# Patient Record
Sex: FEMALE | Race: BLACK OR AFRICAN AMERICAN | NOT HISPANIC OR LATINO | Employment: FULL TIME | URBAN - METROPOLITAN AREA
[De-identification: names, ages, dates, MRNs, and addresses within clinical notes are randomized per-mention and may not be internally consistent; named-entity substitution may affect disease eponyms.]

---

## 2023-09-15 ENCOUNTER — OFFICE VISIT (OUTPATIENT)
Dept: PRIMARY CARE | Facility: CLINIC | Age: 68
End: 2023-09-15
Payer: MEDICARE

## 2023-09-15 VITALS — BODY MASS INDEX: 23.86 KG/M2 | WEIGHT: 139 LBS | SYSTOLIC BLOOD PRESSURE: 131 MMHG | DIASTOLIC BLOOD PRESSURE: 71 MMHG

## 2023-09-15 DIAGNOSIS — M43.06 LUMBAR SPONDYLOLYSIS: Primary | ICD-10-CM

## 2023-09-15 DIAGNOSIS — R00.2 PALPITATIONS: ICD-10-CM

## 2023-09-15 PROCEDURE — 1157F ADVNC CARE PLAN IN RCRD: CPT | Performed by: INTERNAL MEDICINE

## 2023-09-15 PROCEDURE — 1125F AMNT PAIN NOTED PAIN PRSNT: CPT | Performed by: INTERNAL MEDICINE

## 2023-09-15 PROCEDURE — 99203 OFFICE O/P NEW LOW 30 MIN: CPT | Performed by: INTERNAL MEDICINE

## 2023-09-15 NOTE — PROGRESS NOTES
Subjective   Patient ID: Agnes Vasquez is a 68 y.o. female who presents for No chief complaint on file..    HPI the patient for first time see updated front sheet former [facility] and [facility] patient new chest pain no shortness of breath occasional palpitations has seen the cardiologist in the past not taking any medications in the current basis has side longstanding lower back pain and curvature to her spine but has not had physical therapy or intervention since prior to the COVID pandemic bowels normal no dysuria    Past medical history lumbar spondylosis hyperlipidemia    Medications none at the current time    Allergies no known drug allergies    Social history no tobacco    Family history there is a history of osteoarthritis    Prevention some exercise no recent blood work since the beginning of the year    Review of Systems    Objective   There were no vitals taken for this visit.    Physical Exam vital signs noted alert and oriented x3 NCAT no JVD or bruit chest clear to auscultation CV regular rate and rhythm S1-S2 without murmur gallop rub or skip thoracolumbar spine with scoliosis elevated shoulder and hip negative straight leg raise negative logroll negative SI joint tenderness normal gait extremities no clubbing cyanosis or edema normal distal pulses DTR 1+    Assessment/Plan impression lumbar spondylolysis other diagnoses palpitations?  Other diagnoses based on prior laboratory results  Plan we will recheck for regular physical examination and updated blood work okay for LS-spine film AP lateral requisition made and advised on possibility probability of medications pain management aqua therapy physical therapy or other interventions has occasionally taken Advil and heating pad for this has follow-up with cardiologist but none recently good water consumption back hygiene recheck as above

## 2023-09-26 ENCOUNTER — TELEPHONE (OUTPATIENT)
Dept: PRIMARY CARE | Facility: CLINIC | Age: 68
End: 2023-09-26
Payer: MEDICARE

## 2023-09-28 DIAGNOSIS — M43.06 LUMBAR SPONDYLOLYSIS: Primary | ICD-10-CM

## 2023-10-01 RX ORDER — MELOXICAM 7.5 MG/1
7.5 TABLET ORAL DAILY PRN
Qty: 30 TABLET | Refills: 0 | Status: SHIPPED | OUTPATIENT
Start: 2023-10-01 | End: 2023-11-02

## 2023-10-16 ENCOUNTER — TELEPHONE (OUTPATIENT)
Dept: PRIMARY CARE | Facility: CLINIC | Age: 68
End: 2023-10-16
Payer: MEDICARE

## 2023-10-19 ENCOUNTER — OFFICE VISIT (OUTPATIENT)
Dept: PRIMARY CARE | Facility: CLINIC | Age: 68
End: 2023-10-19
Payer: MEDICARE

## 2023-10-19 VITALS — RESPIRATION RATE: 12 BRPM | HEART RATE: 72 BPM

## 2023-10-19 DIAGNOSIS — M54.6 PAIN IN THORACIC SPINE: ICD-10-CM

## 2023-10-19 DIAGNOSIS — M54.50 LOW BACK PAIN WITH RADIATION: Primary | ICD-10-CM

## 2023-10-19 PROCEDURE — 99213 OFFICE O/P EST LOW 20 MIN: CPT | Performed by: INTERNAL MEDICINE

## 2023-10-19 PROCEDURE — 1125F AMNT PAIN NOTED PAIN PRSNT: CPT | Performed by: INTERNAL MEDICINE

## 2023-10-19 NOTE — PROGRESS NOTES
Subjective   Patient ID: Agnes Vasquez is a 68 y.o. female who presents for No chief complaint on file..    HPI follow-up visit she is not going to therapy had or taken meloxicam yet or done stretching or exercises yet because she was concerned about hurting anything her films were reviewed previous blood work was reviewed she has some discomfort now going down into her right leg and heel no chest pain no shortness of breath no fever no bowel no bladder issues    Review of Systems    Objective   There were no vitals taken for this visit.  Vital signs noted alert and oriented x3 NCAT no JVD or bruit chest clear to auscultation CV regular rate and rhythm S1-S2 cervical spine nontender spinous process thoracic spine some tenderness along the paraspinal muscles the right side is more prominent she is right-handed LS spine moderate curvature hips are equal nontender trochanters negative straight leg raise negative logroll negative SI joint tenderness extremities no clubbing cyanosis or edema normal distal pulses DTR 2+  Physical Exam    Assessment/Plan impression lumbar and thoracic diagnoses and other nonspine diagnoses  Plan discussed with heat and stretches and hydration discussed with going to therapy starting next week discussed with using the meloxicam at least once per day running up to her first therapy session to help with inflammation May use Tylenol as above then may reevaluate for pain management or advanced imaging and follow-up for regular visit and based on above

## 2023-10-20 PROBLEM — R27.8 MUSCULAR INCOORDINATION: Status: ACTIVE | Noted: 2022-06-01

## 2023-10-20 PROBLEM — R92.30 DENSE BREAST TISSUE: Status: ACTIVE | Noted: 2023-10-20

## 2023-10-20 PROBLEM — R93.89 THICKENED ENDOMETRIUM: Status: ACTIVE | Noted: 2023-10-20

## 2023-10-20 PROBLEM — R10.2 PELVIC CRAMPING: Status: ACTIVE | Noted: 2023-10-20

## 2023-10-20 PROBLEM — B35.1 DERMATOPHYTOSIS, NAIL: Status: ACTIVE | Noted: 2017-07-24

## 2023-10-20 PROBLEM — I42.8 CARDIOMYOPATHY, NONISCHEMIC (MULTI): Status: ACTIVE | Noted: 2023-02-06

## 2023-10-20 PROBLEM — N81.11 CYSTOCELE, MIDLINE: Status: ACTIVE | Noted: 2022-04-20

## 2023-10-20 PROBLEM — L98.9 DERMATOLOGIC DISEASE: Status: ACTIVE | Noted: 2023-10-20

## 2023-10-20 PROBLEM — L28.0 LICHEN SIMPLEX CHRONICUS: Status: ACTIVE | Noted: 2021-05-20

## 2023-10-20 PROBLEM — R00.2 PALPITATION: Status: ACTIVE | Noted: 2023-10-20

## 2023-10-20 PROBLEM — N88.2 CERVICAL STENOSIS (UTERINE CERVIX): Status: ACTIVE | Noted: 2023-10-20

## 2023-10-20 PROBLEM — N39.41 URGE INCONTINENCE: Status: ACTIVE | Noted: 2022-04-20

## 2023-10-20 PROBLEM — Z09 CARDIOLOGY FOLLOW-UP ENCOUNTER: Status: ACTIVE | Noted: 2023-10-20

## 2023-10-20 RX ORDER — TRIAMCINOLONE ACETONIDE 0.25 MG/G
1 OINTMENT TOPICAL 2 TIMES DAILY
COMMUNITY

## 2023-10-20 RX ORDER — ASPIRIN 81 MG/1
81 TABLET ORAL DAILY
COMMUNITY

## 2023-10-20 RX ORDER — PANTOPRAZOLE SODIUM 40 MG/1
40 TABLET, DELAYED RELEASE ORAL DAILY
COMMUNITY

## 2023-10-20 RX ORDER — MELATONIN 5 MG
1 CAPSULE ORAL NIGHTLY
COMMUNITY

## 2023-10-20 RX ORDER — MAGNESIUM 250 MG
250 TABLET ORAL
COMMUNITY

## 2023-10-20 RX ORDER — HYDROXYZINE HYDROCHLORIDE 25 MG/1
25 TABLET, FILM COATED ORAL NIGHTLY PRN
COMMUNITY

## 2023-10-20 RX ORDER — CLOBETASOL PROPIONATE 0.5 MG/G
1 CREAM TOPICAL 2 TIMES DAILY
COMMUNITY
Start: 2019-02-11

## 2023-10-20 RX ORDER — MINERAL OIL
180 ENEMA (ML) RECTAL
COMMUNITY

## 2023-10-20 RX ORDER — HYDROCORTISONE 25 MG/ML
1 LOTION TOPICAL DAILY
COMMUNITY

## 2023-10-20 RX ORDER — OLOPATADINE HYDROCHLORIDE 1 MG/ML
1 SOLUTION/ DROPS OPHTHALMIC 2 TIMES DAILY
COMMUNITY
Start: 2013-07-08

## 2023-10-20 RX ORDER — LIDOCAINE 50 MG/G
1 PATCH TOPICAL DAILY
COMMUNITY

## 2023-10-20 RX ORDER — MELATONIN 10 MG
TABLET, SUBLINGUAL SUBLINGUAL
COMMUNITY

## 2023-10-20 RX ORDER — FLUOCINONIDE 0.5 MG/G
1 OINTMENT TOPICAL 2 TIMES DAILY
COMMUNITY
Start: 2013-06-28

## 2023-10-20 RX ORDER — FERROUS SULFATE, DRIED 160(50) MG
1 TABLET, EXTENDED RELEASE ORAL DAILY
COMMUNITY
Start: 2016-01-16

## 2023-10-23 ENCOUNTER — EVALUATION (OUTPATIENT)
Dept: PHYSICAL THERAPY | Facility: CLINIC | Age: 68
End: 2023-10-23
Payer: MEDICARE

## 2023-10-23 DIAGNOSIS — M54.16 LUMBAR RADICULOPATHY, RIGHT: Primary | ICD-10-CM

## 2023-10-23 PROCEDURE — 97161 PT EVAL LOW COMPLEX 20 MIN: CPT | Mod: GP

## 2023-10-23 PROCEDURE — 97110 THERAPEUTIC EXERCISES: CPT | Mod: GP

## 2023-10-23 ASSESSMENT — ENCOUNTER SYMPTOMS
OCCASIONAL FEELINGS OF UNSTEADINESS: 0
LOSS OF SENSATION IN FEET: 0
DEPRESSION: 0

## 2023-10-23 NOTE — LETTER
October 23, 2023     Patient: Agnes Vasquez   YOB: 1955   Date of Visit: 10/23/2023       To Whom It May Concern:    It is my medical opinion that Agnes Vasquez {Work release (duty restriction):72128}.    If you have any questions or concerns, please don't hesitate to call.         Sincerely,        Bryon Pringle, PT    CC: No Recipients

## 2023-10-23 NOTE — Clinical Note
October 23, 2023     Patient: Agnes Vasquez   YOB: 1955   Date of Visit: 10/23/2023       To Whom it May Concern:    Agnes Vasquez was seen in my clinic on 10/23/2023. She {Return to school/sport:26031}.    If you have any questions or concerns, please don't hesitate to call.         Sincerely,          Bryon Pringle, PT        CC: No Recipients

## 2023-10-31 ENCOUNTER — TREATMENT (OUTPATIENT)
Dept: PHYSICAL THERAPY | Facility: CLINIC | Age: 68
End: 2023-10-31
Payer: MEDICARE

## 2023-10-31 DIAGNOSIS — M54.16 LUMBAR RADICULOPATHY, RIGHT: Primary | ICD-10-CM

## 2023-10-31 PROCEDURE — 97110 THERAPEUTIC EXERCISES: CPT | Mod: GP,CQ | Performed by: PHYSICAL THERAPY ASSISTANT

## 2023-10-31 NOTE — PROGRESS NOTES
"Physical Therapy    Physical Therapy Treatment    Patient Name: Agnes Vasquez  MRN: [id number]  Today's Date: 10/31/2023  Time Calculation  Start Time: 1400  Stop Time: 1500  Time Calculation (min): 60 min  Visit #2    Assessment:  Good return demo of HEP .  Stopped hold tome on bridges d/t muscle weakness resulting in back symptoms    Plan:  Check how her walk went. Add 4 way hip w/ LF band, as tolerated.     Current Problem  1. Lumbar radiculopathy, right            Subjective      Feeling better w/ HEP and Meloxicam. Tolerances: sitting 1 hour, standing 1 hour, walking 1-3 hours (been several months since she has walked this distance)    Pain   In low back and LE's OOB every morning     Objective   NML Lateral hip sway during amb     Treatments:  Therapeutic Exercise  Therapeutic Exercise Performed: Yes  Therapeutic Exercise Activity 1: LTR 10 x 5\"  Therapeutic Exercise Activity 2: open book 10 x 10\"  Therapeutic Exercise Activity 3: piriformis stretch 3 x 30\"  Therapeutic Exercise Activity 4: supine PPT 20 x 5\"  Therapeutic Exercise Activity 5: All body bridge 2 x 10 --no hold d/t LBP  Therapeutic Exercise Activity 6: Supine and seated sciatic nerve glides  Therapeutic Exercise Activity 7: seated sciatic glide 5 reps R/L  Therapeutic Exercise Activity 8: L side lying x 3 min  Therapeutic Exercise Activity 9: Rows and pull downs x 10 reps each  Therapeutic Exercise Activity 10: CP x 5 min ; NC    Goals:  Active       PT Problem       Patient will report improvement via the KATHERIN to show improved activity tolerance.        Start:  10/23/23    Expected End:  01/21/24            Patient will report compliance with her home exercise program.        Start:  10/23/23    Expected End:  01/21/24            Patient will demonstrate improvements in core and hip strength to 5/5 in all motions to decrease pain with weight bearing activity.        Start:  10/23/23    Expected End:  01/21/24               "

## 2023-11-02 DIAGNOSIS — M43.06 LUMBAR SPONDYLOLYSIS: ICD-10-CM

## 2023-11-02 RX ORDER — MELOXICAM 7.5 MG/1
7.5 TABLET ORAL DAILY PRN
Qty: 30 TABLET | Refills: 0 | Status: SHIPPED | OUTPATIENT
Start: 2023-11-02 | End: 2023-11-30

## 2023-11-06 ENCOUNTER — TREATMENT (OUTPATIENT)
Dept: PHYSICAL THERAPY | Facility: CLINIC | Age: 68
End: 2023-11-06
Payer: MEDICARE

## 2023-11-06 DIAGNOSIS — M54.16 LUMBAR RADICULOPATHY, RIGHT: Primary | ICD-10-CM

## 2023-11-06 NOTE — PROGRESS NOTES
Physical Therapy                 Therapy Communication Note    Patient Name: Agnes Vasquez  MRN: [id number]  Today's Date: 11/6/2023     Discipline: Physical Therapy    Missed Visit Reason:      Missed Time: Cancel    Comment:

## 2023-11-13 ENCOUNTER — TREATMENT (OUTPATIENT)
Dept: PHYSICAL THERAPY | Facility: CLINIC | Age: 68
End: 2023-11-13
Payer: MEDICARE

## 2023-11-13 DIAGNOSIS — M54.16 LUMBAR RADICULOPATHY, RIGHT: Primary | ICD-10-CM

## 2023-11-13 PROCEDURE — 97110 THERAPEUTIC EXERCISES: CPT | Mod: GP

## 2023-11-13 NOTE — PROGRESS NOTES
"Physical Therapy    Physical Therapy Treatment    Patient Name: Agnes Vasquez  MRN: [id number]  Today's Date: 11/13/2023  Time Calculation  Start Time: 1100  Stop Time: 1145  Time Calculation (min): 45 min  Visit: 3    Assessment:  PT Assessment  Assessment Comment: Patient tolerated progression of core strengthening and stretching well this session.    Plan:  OP PT Plan  Treatment/Interventions: Aquatic therapy, Cryotherapy, Dry needling, Education/ Instruction, Electrical stimulation, Hot pack, Manual therapy, Neuromuscular re-education, Therapeutic activities, Therapeutic exercises  PT Plan: Skilled PT  PT Frequency: 1 time per week  Duration: 8 weeks  Onset Date: 07/23/23  Number of Treatments Authorized: Auth Required  Rehab Potential: Excellent  Plan of Care Agreement: Patient    Current Problem  1. Lumbar radiculopathy, right            Subjective   General  Reason for Referral: right sided sciatica  Referred By: Trev Lagunas MD  General Comment: Patient reports no low back pain currently.  Pain   0/10    Objective     Treatments:  Therapeutic Exercise  Therapeutic Exercise Performed: Yes  Therapeutic Exercise Activity 1: LTR 10 x 5\"  Therapeutic Exercise Activity 2: open book 10 x 10\"  Therapeutic Exercise Activity 3: piriformis stretch 3 x 30\"  Therapeutic Exercise Activity 4: supine PPT 20 x 5\"  Therapeutic Exercise Activity 5: bridge x 10  Therapeutic Exercise Activity 6: supine sciatic nerve glides  Therapeutic Exercise Activity 7: hamstring curl machine 22# 3 x 10  Therapeutic Exercise Activity 8: back extension machine 40# 3 x 10  Therapeutic Exercise Activity 9: Rows and pull downs 25# 3 x 10 reps each    Goals:  Active       PT Problem       Patient will report improvement via the KATHERIN to show improved activity tolerance.        Start:  10/23/23    Expected End:  01/21/24            Patient will report compliance with her home exercise program.        Start:  10/23/23    Expected End:  01/21/24  "           Patient will demonstrate improvements in core and hip strength to 5/5 in all motions to decrease pain with weight bearing activity.        Start:  10/23/23    Expected End:  01/21/24

## 2023-11-22 ENCOUNTER — OFFICE VISIT (OUTPATIENT)
Dept: PRIMARY CARE | Facility: CLINIC | Age: 68
End: 2023-11-22
Payer: MEDICARE

## 2023-11-22 VITALS — SYSTOLIC BLOOD PRESSURE: 123 MMHG | DIASTOLIC BLOOD PRESSURE: 73 MMHG

## 2023-11-22 DIAGNOSIS — J01.00 ACUTE MAXILLARY SINUSITIS, RECURRENCE NOT SPECIFIED: ICD-10-CM

## 2023-11-22 DIAGNOSIS — Z00.00 HEALTH CARE MAINTENANCE: Primary | ICD-10-CM

## 2023-11-22 DIAGNOSIS — M54.50 LOW BACK PAIN, UNSPECIFIED BACK PAIN LATERALITY, UNSPECIFIED CHRONICITY, UNSPECIFIED WHETHER SCIATICA PRESENT: ICD-10-CM

## 2023-11-22 DIAGNOSIS — G44.209 TENSION HEADACHE: ICD-10-CM

## 2023-11-22 PROCEDURE — 99213 OFFICE O/P EST LOW 20 MIN: CPT | Performed by: INTERNAL MEDICINE

## 2023-11-22 PROCEDURE — 1125F AMNT PAIN NOTED PAIN PRSNT: CPT | Performed by: INTERNAL MEDICINE

## 2023-11-22 RX ORDER — AZITHROMYCIN 250 MG/1
TABLET, FILM COATED ORAL
Qty: 6 TABLET | Refills: 0 | Status: SHIPPED | OUTPATIENT
Start: 2023-11-22 | End: 2023-11-27

## 2023-11-22 NOTE — PROGRESS NOTES
Subjective   Patient ID: Agnes Vasquez is a 68 y.o. female who presents for No chief complaint on file..    HPI follow-up visit and sick visit she is doing better with her back with the physical therapy has run out of sessions but is doing PT exercises at home and is using the meloxicam on an occasional basis appears to be doing okay with that has been having some headaches then started with green discharge no fever had negative COVID test no chest pain no shortness of breath    Review of Systems    Objective   There were no vitals taken for this visit.    Physical Exam vital signs noted alert and oriented x 3 NCAT no temporal artery tenderness no jaw click nares swollen turbinates heavy green discharge OP benign erythema TM normal bilateral EAC clear bilateral no AC nodes no JVD chest clear to auscultation CV regular rate and rhythm S1-S2 extremities no clubbing cyanosis or edema normal distal pulses    Assessment/Plan impression acute frontal and maxillary sinusitis headache low back pain other diagnoses  Plan continue with back hygiene back stretches occasional meloxicam use for the headache may use Tylenol stop using diphenhydramine okay for Mucinex okay for prescription Zithromax 250 mg tablet take 2 tablets first day 1 for each of the next 4 days #1 traditional Z-Chuck and 0 refills increase hydration recheck if no better and for regular visit

## 2023-11-25 DIAGNOSIS — M43.06 LUMBAR SPONDYLOLYSIS: ICD-10-CM

## 2023-11-30 RX ORDER — MELOXICAM 7.5 MG/1
7.5 TABLET ORAL DAILY PRN
Qty: 30 TABLET | Refills: 0 | Status: SHIPPED | OUTPATIENT
Start: 2023-11-30 | End: 2023-12-30

## 2023-12-15 ENCOUNTER — TELEPHONE (OUTPATIENT)
Dept: PRIMARY CARE | Facility: CLINIC | Age: 68
End: 2023-12-15

## 2023-12-18 ENCOUNTER — TELEPHONE (OUTPATIENT)
Dept: PRIMARY CARE | Facility: CLINIC | Age: 68
End: 2023-12-18
Payer: MEDICARE

## 2023-12-18 ENCOUNTER — TELEPHONE (OUTPATIENT)
Dept: PRIMARY CARE | Facility: CLINIC | Age: 68
End: 2023-12-18

## 2024-01-09 ENCOUNTER — TELEPHONE (OUTPATIENT)
Dept: PRIMARY CARE | Facility: CLINIC | Age: 69
End: 2024-01-09

## 2024-01-12 DIAGNOSIS — L80 VITILIGO: Primary | ICD-10-CM

## 2024-01-31 ENCOUNTER — DOCUMENTATION (OUTPATIENT)
Dept: PHYSICAL THERAPY | Facility: CLINIC | Age: 69
End: 2024-01-31
Payer: MEDICARE

## 2024-01-31 NOTE — PROGRESS NOTES
Physical Therapy    Discharge Summary    Name: Agnes Vasquez  MRN: [id number]  : 1955  Date: 24    Discharge Summary: PT    Discharge Information: Date of discharge 24, Date of last visit 23, Date of evaluation 10/23/23, Number of attended visits 3, Referred by Trev Lagunas MD, and Referred for right sided sciatica    Therapy Summary: Plan of care consisted of stretching and core strengthening.     Discharge Status: Discharge from physical therapy      Rehab Discharge Reason: Failed to schedule and/or keep follow-up appointment(s)

## 2024-03-14 ENCOUNTER — LAB (OUTPATIENT)
Dept: LAB | Facility: LAB | Age: 69
End: 2024-03-14
Payer: MEDICARE

## 2024-03-14 ENCOUNTER — OFFICE VISIT (OUTPATIENT)
Dept: PRIMARY CARE | Facility: CLINIC | Age: 69
End: 2024-03-14
Payer: MEDICARE

## 2024-03-14 VITALS — DIASTOLIC BLOOD PRESSURE: 84 MMHG | SYSTOLIC BLOOD PRESSURE: 178 MMHG

## 2024-03-14 DIAGNOSIS — I10 PRIMARY HYPERTENSION: ICD-10-CM

## 2024-03-14 DIAGNOSIS — E78.2 MIXED HYPERLIPIDEMIA: ICD-10-CM

## 2024-03-14 DIAGNOSIS — R00.2 PALPITATIONS: ICD-10-CM

## 2024-03-14 DIAGNOSIS — R00.2 PALPITATIONS: Primary | ICD-10-CM

## 2024-03-14 DIAGNOSIS — M25.512 LEFT SHOULDER PAIN, UNSPECIFIED CHRONICITY: ICD-10-CM

## 2024-03-14 LAB
ANION GAP SERPL CALC-SCNC: 13 MMOL/L (ref 10–20)
BUN SERPL-MCNC: 12 MG/DL (ref 6–23)
CALCIUM SERPL-MCNC: 9.9 MG/DL (ref 8.6–10.6)
CHLORIDE SERPL-SCNC: 105 MMOL/L (ref 98–107)
CHOLEST SERPL-MCNC: 235 MG/DL (ref 0–199)
CHOLESTEROL/HDL RATIO: 2.9
CO2 SERPL-SCNC: 29 MMOL/L (ref 21–32)
CREAT SERPL-MCNC: 0.86 MG/DL (ref 0.5–1.05)
EGFRCR SERPLBLD CKD-EPI 2021: 73 ML/MIN/1.73M*2
ERYTHROCYTE [DISTWIDTH] IN BLOOD BY AUTOMATED COUNT: 14.8 % (ref 11.5–14.5)
GLUCOSE SERPL-MCNC: 101 MG/DL (ref 74–99)
HCT VFR BLD AUTO: 42.5 % (ref 36–46)
HDLC SERPL-MCNC: 80.1 MG/DL
HGB BLD-MCNC: 13.9 G/DL (ref 12–16)
LDLC SERPL CALC-MCNC: 135 MG/DL
MCH RBC QN AUTO: 30 PG (ref 26–34)
MCHC RBC AUTO-ENTMCNC: 32.7 G/DL (ref 32–36)
MCV RBC AUTO: 92 FL (ref 80–100)
NON HDL CHOLESTEROL: 155 MG/DL (ref 0–149)
NRBC BLD-RTO: 0 /100 WBCS (ref 0–0)
PLATELET # BLD AUTO: 167 X10*3/UL (ref 150–450)
POTASSIUM SERPL-SCNC: 4.6 MMOL/L (ref 3.5–5.3)
RBC # BLD AUTO: 4.63 X10*6/UL (ref 4–5.2)
SODIUM SERPL-SCNC: 142 MMOL/L (ref 136–145)
TRIGL SERPL-MCNC: 100 MG/DL (ref 0–149)
TSH SERPL-ACNC: 0.94 MIU/L (ref 0.44–3.98)
VLDL: 20 MG/DL (ref 0–40)
WBC # BLD AUTO: 4.1 X10*3/UL (ref 4.4–11.3)

## 2024-03-14 PROCEDURE — 80048 BASIC METABOLIC PNL TOTAL CA: CPT

## 2024-03-14 PROCEDURE — 80061 LIPID PANEL: CPT

## 2024-03-14 PROCEDURE — 84443 ASSAY THYROID STIM HORMONE: CPT

## 2024-03-14 PROCEDURE — 36415 COLL VENOUS BLD VENIPUNCTURE: CPT

## 2024-03-14 PROCEDURE — 1157F ADVNC CARE PLAN IN RCRD: CPT | Performed by: INTERNAL MEDICINE

## 2024-03-14 PROCEDURE — 3077F SYST BP >= 140 MM HG: CPT | Performed by: INTERNAL MEDICINE

## 2024-03-14 PROCEDURE — 3079F DIAST BP 80-89 MM HG: CPT | Performed by: INTERNAL MEDICINE

## 2024-03-14 PROCEDURE — 85027 COMPLETE CBC AUTOMATED: CPT

## 2024-03-14 PROCEDURE — 1159F MED LIST DOCD IN RCRD: CPT | Performed by: INTERNAL MEDICINE

## 2024-03-14 PROCEDURE — 99214 OFFICE O/P EST MOD 30 MIN: CPT | Performed by: INTERNAL MEDICINE

## 2024-03-14 NOTE — PROGRESS NOTES
Subjective   Patient ID: Agnes Vasquez is a 69 y.o. female who presents for No chief complaint on file..    HPI follow-up visit no chest pain no shortness of breath but has been having some palpitation she evidently went to the cardiologist and just had an EKG and a stress test was told she was okay did not have any blood work sometimes blood sugars been slightly elevated cholesterol has been slightly elevated blood pressure have not been as elevated as it has been recently she has been having more salt in the diet and sometimes more alcohol has exercise Until recently    Review of Systems    Objective   There were no vitals taken for this visit.    Physical Exam vital signs noted alert and oriented x 3 NCAT no lid lag no proptosis no JVD or bruit no thyromegaly chest clear to auscultation and percussion CV regular rate and rhythm S1-S2 without murmur gallop or rub abdomen soft nontender normal active bowel sounds extremities no clubbing cyanosis or edema normal distal pulses DTR 2+ musculoskeletal left shoulder full range of motion nontender normal handgrip DTR 2+ normal dexterity of the upper extremities    Assessment/Plan    impression hypertension hyperlipidemia glucose intolerance?  Left shoulder discomfort palpitation  Plan had complained of some left shoulder achiness.  Been no injury did not appear to be related to cervical spine this had occurred on and off even prior to her recent blood pressure elevations she evidently has had some skips noted in the past to account for her palpitation check Chem-7 advised on glucose potassium and kidney function after review cardiac studies select blood pressure medication she will call back in 1 day check lipid panel advised on cholesterol profile check CBC advised on blood count check TSH advised on thyroid watch salt in diet limited regular exercise increase water consumption weight stability and recheck 2 to 4 weeks on blood pressure medication TT 40 cc 21

## 2024-03-15 ENCOUNTER — OFFICE VISIT (OUTPATIENT)
Dept: DERMATOLOGY | Facility: CLINIC | Age: 69
End: 2024-03-15
Payer: MEDICARE

## 2024-03-15 DIAGNOSIS — L80 VITILIGO: Primary | ICD-10-CM

## 2024-03-15 DIAGNOSIS — L82.1 SEBORRHEIC KERATOSIS: ICD-10-CM

## 2024-03-15 PROCEDURE — 1157F ADVNC CARE PLAN IN RCRD: CPT | Performed by: DERMATOLOGY

## 2024-03-15 PROCEDURE — 1159F MED LIST DOCD IN RCRD: CPT | Performed by: DERMATOLOGY

## 2024-03-15 PROCEDURE — 99203 OFFICE O/P NEW LOW 30 MIN: CPT | Performed by: DERMATOLOGY

## 2024-03-15 NOTE — PROGRESS NOTES
Subjective     Agnes Vasquez is a 69 y.o. female who presents for the following: Vitiligo (Vitiligo on wrists, rt leg, genitals and upper lip. Moles on scalp).     Review of Systems:  No other skin or systemic complaints other than what is documented elsewhere in the note.    The following portions of the chart were reviewed this encounter and updated as appropriate:          Skin Cancer History  No skin cancer on file.      Specialty Problems          Dermatology Problems    Dermatophytosis, nail    Lichen simplex chronicus    Dermatologic disease        Objective   Well appearing patient in no apparent distress; mood and affect are within normal limits.    A focused skin examination was performed. All findings within normal limits unless otherwise noted below.    Assessment/Plan   1. Vitiligo  Left Lower Leg - Anterior, Left Wrist - Anterior, Pubic, Right Labial Mucosa of the Upper Lip, Right Lower Leg - Anterior, Right Wrist - Anterior  Depigmented well-demarcated macules and patches.    -Discussed etiology and autoimmune nature of condition (patient also has history of lichen planus, lichen sclerosis, and family history of psoriasis)  -Went over the treatment options including topicals and NBUVB  -START oplezura 1.5% cream BID to the affected areas. We went over risks, benefits and side effects and patient expressed understanding      Related Medications  ruxolitinib (Opzelura) 1.5 % cream cream  Apply 1 Application topically 2 times a day.    2. Seborrheic keratosis (4)  Head - Anterior (Face), Left Shoulder - Anterior, Right Lower Leg - Anterior, Scalp  Stuck on verrucous, tan-brown papules and plaques.      Kellie Frederick MD  PGY-4 Dermatology    RTC in 3-5 months in person, patient will call in 1 month to get scheduled    I saw and evaluated the patient. I personally obtained the key and critical portions of the history and physical exam or was physically present for key and critical portions  performed by the resident/fellow. I reviewed the resident/fellow's documentation and discussed the patient with the resident/fellow. I agree with the resident/fellow's medical decision making as documented in the note.    Castillo Brown MD PhD

## 2024-04-17 ENCOUNTER — HOSPITAL ENCOUNTER (OUTPATIENT)
Dept: RADIOLOGY | Facility: CLINIC | Age: 69
Discharge: HOME | End: 2024-04-17
Payer: MEDICARE

## 2024-04-17 VITALS — BODY MASS INDEX: 23.71 KG/M2 | HEIGHT: 64 IN | WEIGHT: 138.89 LBS

## 2024-04-17 DIAGNOSIS — L80 VITILIGO: ICD-10-CM

## 2024-04-17 DIAGNOSIS — Z12.31 SCREENING MAMMOGRAM FOR BREAST CANCER: ICD-10-CM

## 2024-04-17 PROCEDURE — 77063 BREAST TOMOSYNTHESIS BI: CPT | Performed by: RADIOLOGY

## 2024-04-17 PROCEDURE — 77067 SCR MAMMO BI INCL CAD: CPT | Performed by: RADIOLOGY

## 2024-04-17 PROCEDURE — 77067 SCR MAMMO BI INCL CAD: CPT

## 2024-04-18 ENCOUNTER — HOSPITAL ENCOUNTER (OUTPATIENT)
Dept: RADIOLOGY | Facility: EXTERNAL LOCATION | Age: 69
Discharge: HOME | End: 2024-04-18

## 2024-04-19 ENCOUNTER — APPOINTMENT (OUTPATIENT)
Dept: DERMATOLOGY | Facility: CLINIC | Age: 69
End: 2024-04-19
Payer: MEDICARE

## 2024-04-24 ENCOUNTER — APPOINTMENT (OUTPATIENT)
Dept: RADIOLOGY | Facility: CLINIC | Age: 69
End: 2024-04-24
Payer: MEDICARE

## 2024-04-24 DIAGNOSIS — Z12.31 SCREENING MAMMOGRAM FOR BREAST CANCER: ICD-10-CM

## 2024-07-03 DIAGNOSIS — Z00.00 HEALTH CARE MAINTENANCE: Primary | ICD-10-CM

## 2024-07-03 RX ORDER — MELOXICAM 7.5 MG/1
7.5 TABLET ORAL
COMMUNITY
Start: 2024-02-19 | End: 2024-07-03 | Stop reason: SDUPTHER

## 2024-07-04 RX ORDER — MELOXICAM 7.5 MG/1
7.5 TABLET ORAL
Qty: 30 TABLET | Refills: 0 | Status: SHIPPED | OUTPATIENT
Start: 2024-07-04

## 2024-07-10 ENCOUNTER — LAB (OUTPATIENT)
Dept: LAB | Facility: LAB | Age: 69
End: 2024-07-10
Payer: MEDICARE

## 2024-07-10 ENCOUNTER — APPOINTMENT (OUTPATIENT)
Dept: PRIMARY CARE | Facility: CLINIC | Age: 69
End: 2024-07-10
Payer: MEDICARE

## 2024-07-10 VITALS — SYSTOLIC BLOOD PRESSURE: 134 MMHG | DIASTOLIC BLOOD PRESSURE: 75 MMHG

## 2024-07-10 DIAGNOSIS — M54.50 LOW BACK PAIN, UNSPECIFIED BACK PAIN LATERALITY, UNSPECIFIED CHRONICITY, UNSPECIFIED WHETHER SCIATICA PRESENT: ICD-10-CM

## 2024-07-10 DIAGNOSIS — R73.02 GLUCOSE INTOLERANCE (IMPAIRED GLUCOSE TOLERANCE): Primary | ICD-10-CM

## 2024-07-10 DIAGNOSIS — R73.02 GLUCOSE INTOLERANCE (IMPAIRED GLUCOSE TOLERANCE): ICD-10-CM

## 2024-07-10 DIAGNOSIS — H61.23 EXCESSIVE CERUMEN IN BOTH EAR CANALS: ICD-10-CM

## 2024-07-10 DIAGNOSIS — I10 PRIMARY HYPERTENSION: ICD-10-CM

## 2024-07-10 LAB
EST. AVERAGE GLUCOSE BLD GHB EST-MCNC: 123 MG/DL
HBA1C MFR BLD: 5.9 %

## 2024-07-10 PROCEDURE — 83036 HEMOGLOBIN GLYCOSYLATED A1C: CPT

## 2024-07-10 PROCEDURE — 36415 COLL VENOUS BLD VENIPUNCTURE: CPT

## 2024-07-10 PROCEDURE — 3075F SYST BP GE 130 - 139MM HG: CPT | Performed by: INTERNAL MEDICINE

## 2024-07-10 PROCEDURE — 69210 REMOVE IMPACTED EAR WAX UNI: CPT | Performed by: INTERNAL MEDICINE

## 2024-07-10 PROCEDURE — 1157F ADVNC CARE PLAN IN RCRD: CPT | Performed by: INTERNAL MEDICINE

## 2024-07-10 PROCEDURE — 3078F DIAST BP <80 MM HG: CPT | Performed by: INTERNAL MEDICINE

## 2024-07-10 PROCEDURE — 99213 OFFICE O/P EST LOW 20 MIN: CPT | Performed by: INTERNAL MEDICINE

## 2024-07-10 NOTE — PROGRESS NOTES
Subjective   Patient ID: Agnes Vasquez is a 69 y.o. female who presents for No chief complaint on file..    HPI follow-up visit no chest pain no shortness of breath no polyuria polydipsia was concerned about her blood sugar her sister was recently diagnosed with diabetes her previous blood work was reviewed she has had some marginal blood sugars in the past also check on her blood pressure also look in ears she has been using earbuds    Review of Systems    Objective   There were no vitals taken for this visit.    Physical Exam vital signs noted alert and oriented x 3 NCAT bilateral cerumen impaction right much greater than left decreased hearing on the right no JVD chest clear to auscultation CV regular rate and rhythm S1 is 2 extremities no clubbing cyanosis or edema normal distal pulses    Procedure bilateral Loop removal of impacted cerumen now can hear TM normal bilateral no complications     impression glucose intolerance cerumen impaction    Assessment/Plan hypertension low back and right hip pain  Plan she would prefer to check the glycohemoglobin advised on watching carbohydrates in the diet good water consumption regular exercise back hygiene back stretches right hip hygiene right hip stretches she had complained about some right hip area discomfort she has significant osteoarthritis of the lower spine not apparent that her hips have been filled independently before but she has done physical therapy exercises for this in May we reviewed the films or obtain new films at a later date and then recheck on the blood pressure which is okay today no particular medication is needed for the ears recheck 1 to 3 months based on labs

## 2024-10-02 ENCOUNTER — OFFICE VISIT (OUTPATIENT)
Dept: PRIMARY CARE | Facility: CLINIC | Age: 69
End: 2024-10-02
Payer: MEDICARE

## 2024-10-02 ENCOUNTER — LAB (OUTPATIENT)
Dept: LAB | Facility: LAB | Age: 69
End: 2024-10-02
Payer: MEDICARE

## 2024-10-02 VITALS — DIASTOLIC BLOOD PRESSURE: 73 MMHG | SYSTOLIC BLOOD PRESSURE: 123 MMHG

## 2024-10-02 DIAGNOSIS — M79.10 MYALGIA: ICD-10-CM

## 2024-10-02 DIAGNOSIS — M54.2 CERVICALGIA OF OCCIPITO-ATLANTO-AXIAL REGION: ICD-10-CM

## 2024-10-02 DIAGNOSIS — M43.06 LUMBAR SPONDYLOLYSIS: ICD-10-CM

## 2024-10-02 DIAGNOSIS — M79.10 MYALGIA: Primary | ICD-10-CM

## 2024-10-02 LAB
CK SERPL-CCNC: 99 U/L (ref 0–215)
CRP SERPL-MCNC: 0.11 MG/DL
ERYTHROCYTE [SEDIMENTATION RATE] IN BLOOD BY WESTERGREN METHOD: 7 MM/H (ref 0–30)

## 2024-10-02 PROCEDURE — 1157F ADVNC CARE PLAN IN RCRD: CPT | Performed by: INTERNAL MEDICINE

## 2024-10-02 PROCEDURE — 99213 OFFICE O/P EST LOW 20 MIN: CPT | Performed by: INTERNAL MEDICINE

## 2024-10-02 PROCEDURE — 36415 COLL VENOUS BLD VENIPUNCTURE: CPT

## 2024-10-02 PROCEDURE — 85652 RBC SED RATE AUTOMATED: CPT

## 2024-10-02 PROCEDURE — 82550 ASSAY OF CK (CPK): CPT

## 2024-10-02 PROCEDURE — 86140 C-REACTIVE PROTEIN: CPT

## 2024-10-02 NOTE — PROGRESS NOTES
Subjective   Patient ID: Agnes Vasquez is a 69 y.o. female who presents for No chief complaint on file..    HPI   Follow-up visit and sick visit has had some arthritis in her spine but more recently has had pains in her shoulders and hips and myalgias along her arms and torso in the upper portion of her legs without any particular intervention that is different no new medications no dietary change no increase in exercise she tends to drink enough water has been taking meloxicam it does help some     vital signs noted alert and oriented x 3 NCAT no JVD chest clear to auscultation CV regular rate and rhythm S1-S2 extremities no clubbing cyanosis or edema normal distal pulses musculoskeletal cervical spine full range of motion tightness in the posterior paraspinal muscles ls spine nont sp process neg slr  Review of Systems    Objective   There were no vitals taken for this visit.    Physical Exam tenderness in the tricep area LS spine nontender spinous process negative straight leg raise some irritation over the bilateral trochanters    Assessment/Plan    impression polymyalgia type symptoms other diagnoses cervicalgia lbp/djd  Plan check ESR CPK and CRP there is a family history of other myalgic diseases her sister recently passed away but not of muscle disease her younger sister has a PMR type of process is walking with a cane continue with good hydration proper stretching heat application rom exercise meloxicam as needed until laboratory results returned and then recheck after that/PT other evaluations and therapies

## 2024-10-07 DIAGNOSIS — M79.10 MYALGIA: Primary | ICD-10-CM

## 2024-11-02 NOTE — PROGRESS NOTES
"Subjective   Patient ID: Agnes Vasquez is a 69 y.o. female who presents for New Patient Visit (New patient, referred by PCP Dr. Lagunas, for Myalgia. Patient complains of extreme weight loss, and muscle weakness. ).    HPI  Consult  \"Myalgia\"  Pain Shoulders, arms torso, upper legs     Normal wsr crp tsh cpk     Losing 12 pounds past 3 mos  Eats and in 2 hours has BM 4 BM daily   Asks for Colagard she ordered it on line generic as not approved by PCP   I am not digesting my food  No dysphagia     Pain  also in 3 mos  Shoulder and l side on body UE and LE   Upper torso is hurting  Neck   Constant throb     No joint swelling     I am losing muscle mass entire body    Some fatigue in muscles     LBP for years worse   Tx meloxicam         ROS      Current Outpatient Medications   Medication Sig Dispense Refill    aspirin 81 mg EC tablet Take 1 tablet (81 mg) by mouth once daily.      calcium carbonate-vitamin D3 500 mg-5 mcg (200 unit) tablet Take 1 tablet by mouth once daily.      cholecalciferol, vitamin D3, 250 mcg (10,000 unit) tablet Take by mouth.      hydrocortisone 2.5 % lotion Apply 1 Application topically once daily.      meloxicam (Mobic) 7.5 mg tablet Take 1 tablet (7.5 mg) by mouth once daily. 30 tablet 0    multivitamin capsule Take 1 capsule by mouth once daily.      VITAMIN A ORAL Take 1 capsule by mouth once daily.       No current facility-administered medications for this visit.         has no past medical history on file.   Past Surgical History:   Procedure Laterality Date    BREAST BIOPSY Left     excisional benign bx          Family history is unknown by patient.  Pain Management Panel           No data to display                 Vitals:    11/04/24 1016   BP: 125/77   Pulse: 82   SpO2: 98%     Lab Results   Component Value Date    SEDRATE 7 10/02/2024    CRP 0.11 10/02/2024    TSH 0.94 03/14/2024       PHYSICAL EXAM      NODES all -  HEART rrr no M  LUNGS all whaley clear  ABDOMEN no hepar or " palp spleen  VASCULAR 2+  NEURO no muscle weakness UE or LE Prox or distal   Gait is normal down hallway no fasciculation body or tongue  No observed muscle atrophy   SKIN -  JOINTS no sy    PLAN  Over the past 3 months the patient has been complaining of diffuse pain in the upper and lower extremity    She also complains of significant weight loss and decrease of muscle mass    Other significant issues include possible digestive issues with the need for multiple bowel movements about 2 hours after eating    Her complete rheumatologic and neuromuscular examination is normal there is no evidence of synovial thickening synovitis decreased range of motion or proximal or distal muscle weakness.    At this point I would suggest making sure she does not have a malignancy and suggest CT of abdomen and pelvis to start followed by colonoscopy.    If there is no reason for her complaints of pain muscle atrophy (by history) not noted on physical examination.  I would regroup and reevaluate.    She also mentioned getting tested for celiac disease as well    A copy of this consultation will be sent back to Dr. greene.

## 2024-11-04 ENCOUNTER — APPOINTMENT (OUTPATIENT)
Dept: RHEUMATOLOGY | Facility: CLINIC | Age: 69
End: 2024-11-04
Payer: MEDICARE

## 2024-11-04 VITALS
WEIGHT: 126 LBS | SYSTOLIC BLOOD PRESSURE: 125 MMHG | DIASTOLIC BLOOD PRESSURE: 77 MMHG | BODY MASS INDEX: 21.51 KG/M2 | HEART RATE: 82 BPM | HEIGHT: 64 IN | OXYGEN SATURATION: 98 %

## 2024-11-04 DIAGNOSIS — M79.10 MYALGIA: ICD-10-CM

## 2024-11-04 DIAGNOSIS — M62.50 DISUSE MUSCLE ATROPHY: ICD-10-CM

## 2024-11-04 DIAGNOSIS — R53.83 OTHER FATIGUE: ICD-10-CM

## 2024-11-04 DIAGNOSIS — R63.4 WEIGHT LOSS, NON-INTENTIONAL: Primary | ICD-10-CM

## 2024-11-04 PROCEDURE — 1157F ADVNC CARE PLAN IN RCRD: CPT | Performed by: INTERNAL MEDICINE

## 2024-11-04 PROCEDURE — 3008F BODY MASS INDEX DOCD: CPT | Performed by: INTERNAL MEDICINE

## 2024-11-04 PROCEDURE — 1125F AMNT PAIN NOTED PAIN PRSNT: CPT | Performed by: INTERNAL MEDICINE

## 2024-11-04 PROCEDURE — 99205 OFFICE O/P NEW HI 60 MIN: CPT | Performed by: INTERNAL MEDICINE

## 2024-11-04 PROCEDURE — 1159F MED LIST DOCD IN RCRD: CPT | Performed by: INTERNAL MEDICINE

## 2024-11-04 ASSESSMENT — PAIN SCALES - GENERAL: PAINLEVEL_OUTOF10: 10-WORST PAIN EVER

## 2024-11-04 NOTE — LETTER
"November 4, 2024     Trev Lagunas MD  [address]  [facility], [facility] [address]  [facility] [address]    Patient: Agnes Vasquez   YOB: 1955   Date of Visit: 11/4/2024       Dear Dr. Trev Lagunas MD:    Thank you for referring Agnes Vasquez to me for evaluation. Below are my notes for this consultation.  If you have questions, please do not hesitate to call me. I look forward to following your patient along with you.       Sincerely,     Yoel Carranza, DO      CC: No Recipients  ______________________________________________________________________________________    Subjective  Patient ID: Agnes Vasquez is a 69 y.o. female who presents for New Patient Visit (New patient, referred by PCP Dr. Lagunas, for Myalgia. Patient complains of extreme weight loss, and muscle weakness. ).    HPI  Consult  \"Myalgia\"  Pain Shoulders, arms torso, upper legs     Normal wsr crp tsh cpk     Losing 12 pounds past 3 mos  Eats and in 2 hours has BM 4 BM daily   Asks for Colagard she ordered it on line generic as not approved by PCP   I am not digesting my food  No dysphagia     Pain  also in 3 mos  Shoulder and l side on body UE and LE   Upper torso is hurting  Neck   Constant throb     No joint swelling     I am losing muscle mass entire body    Some fatigue in muscles     LBP for years worse   Tx meloxicam         ROS      Current Outpatient Medications   Medication Sig Dispense Refill   • aspirin 81 mg EC tablet Take 1 tablet (81 mg) by mouth once daily.     • calcium carbonate-vitamin D3 500 mg-5 mcg (200 unit) tablet Take 1 tablet by mouth once daily.     • cholecalciferol, vitamin D3, 250 mcg (10,000 unit) tablet Take by mouth.     • hydrocortisone 2.5 % lotion Apply 1 Application topically once daily.     • meloxicam (Mobic) 7.5 mg tablet Take 1 tablet (7.5 mg) by mouth once daily. 30 tablet 0   • multivitamin capsule Take 1 capsule by mouth once daily.     • VITAMIN A ORAL Take 1 " capsule by mouth once daily.       No current facility-administered medications for this visit.         has no past medical history on file.   Past Surgical History:   Procedure Laterality Date   • BREAST BIOPSY Left     excisional benign bx          Family history is unknown by patient.  Pain Management Panel           No data to display                 Vitals:    11/04/24 1016   BP: 125/77   Pulse: 82   SpO2: 98%     Lab Results   Component Value Date    SEDRATE 7 10/02/2024    CRP 0.11 10/02/2024    TSH 0.94 03/14/2024       PHYSICAL EXAM      NODES all -  HEART rrr no M  LUNGS all whaley clear  ABDOMEN no hepar or palp spleen  VASCULAR 2+  NEURO no muscle weakness UE or LE Prox or distal   Gait is normal down hallway no fasciculation body or tongue  No observed muscle atrophy   SKIN -  JOINTS no sy    PLAN  Over the past 3 months the patient has been complaining of diffuse pain in the upper and lower extremity    She also complains of significant weight loss and decrease of muscle mass    Other significant issues include possible digestive issues with the need for multiple bowel movements about 2 hours after eating    Her complete rheumatologic and neuromuscular examination is normal there is no evidence of synovial thickening synovitis decreased range of motion or proximal or distal muscle weakness.    At this point I would suggest making sure she does not have a malignancy and suggest CT of abdomen and pelvis to start followed by colonoscopy.    If there is no reason for her complaints of pain muscle atrophy (by history) not noted on physical examination.  I would regroup and reevaluate.    She also mentioned getting tested for celiac disease as well    A copy of this consultation will be sent back to Dr. greene.

## 2024-11-11 ENCOUNTER — TELEPHONE (OUTPATIENT)
Dept: PRIMARY CARE | Facility: CLINIC | Age: 69
End: 2024-11-11

## 2024-11-20 ENCOUNTER — APPOINTMENT (OUTPATIENT)
Dept: PRIMARY CARE | Facility: CLINIC | Age: 69
End: 2024-11-20
Payer: MEDICARE

## 2024-11-20 VITALS
OXYGEN SATURATION: 98 % | RESPIRATION RATE: 17 BRPM | HEART RATE: 72 BPM | HEIGHT: 64 IN | WEIGHT: 129 LBS | BODY MASS INDEX: 22.02 KG/M2 | SYSTOLIC BLOOD PRESSURE: 115 MMHG | DIASTOLIC BLOOD PRESSURE: 75 MMHG

## 2024-11-20 DIAGNOSIS — R63.4 WEIGHT LOSS, NON-INTENTIONAL: ICD-10-CM

## 2024-11-20 DIAGNOSIS — K90.9 INTESTINAL MALABSORPTION, UNSPECIFIED TYPE (HHS-HCC): Primary | ICD-10-CM

## 2024-11-20 DIAGNOSIS — R10.9 RIGHT SIDED ABDOMINAL PAIN: ICD-10-CM

## 2024-11-20 PROCEDURE — 3008F BODY MASS INDEX DOCD: CPT | Performed by: INTERNAL MEDICINE

## 2024-11-20 PROCEDURE — 99213 OFFICE O/P EST LOW 20 MIN: CPT | Performed by: INTERNAL MEDICINE

## 2024-11-20 PROCEDURE — 1157F ADVNC CARE PLAN IN RCRD: CPT | Performed by: INTERNAL MEDICINE

## 2024-11-20 PROCEDURE — 1159F MED LIST DOCD IN RCRD: CPT | Performed by: INTERNAL MEDICINE

## 2024-11-20 NOTE — PROGRESS NOTES
"Subjective   Patient ID: Agnes Vasquez is a 69 y.o. female who presents for Weight Loss (Losing weight quickly and uncontrollably/Needs possible ct/Has been to see rheumatology ).    HPI she has found some success with the blended foods and some booster sure some right sided discomfort from time to time some diarrhea from time to time no nausea no emesis she is concerned because sister and father both had pancreas issues no chest pain no shortness of breath    Review of Systems    Objective   Pulse 72   Resp 17   Ht 1.626 m (5' 4\")   SpO2 98%   BMI 21.63 kg/m²   Vital signs alert and oriented x 3 NCAT no icterus no jaundice no pallor no JVD or bruit chest clear to auscultation CV regular rate and rhythm S1-S2 abdomen soft nontender normal active bowel sounds no flank tenderness extremities no clubbing cyanosis or edema normal distal pulses  Physical Exam    Assessment/Plan    impression weight loss right-sided abdominal discomfort question malabsorption  Plan check comprehensive panel advised on blood sugar potassium and kidney function as well as liver function check CBC advised on blood count as well as glucose liver and kidney refer to gastroenterology okay for abdominal pelvic CT scan requisitions made continue with the blended food and good water consumption then then recheck based on above       "

## 2024-12-03 ENCOUNTER — HOSPITAL ENCOUNTER (OUTPATIENT)
Dept: RADIOLOGY | Facility: CLINIC | Age: 69
Discharge: HOME | End: 2024-12-03
Payer: MEDICARE

## 2024-12-03 DIAGNOSIS — K90.9 INTESTINAL MALABSORPTION, UNSPECIFIED TYPE (HHS-HCC): ICD-10-CM

## 2024-12-03 DIAGNOSIS — R63.4 WEIGHT LOSS, NON-INTENTIONAL: ICD-10-CM

## 2024-12-03 PROCEDURE — 74176 CT ABD & PELVIS W/O CONTRAST: CPT

## 2024-12-03 PROCEDURE — 74176 CT ABD & PELVIS W/O CONTRAST: CPT | Performed by: RADIOLOGY

## 2025-01-28 NOTE — PROGRESS NOTES
History Of Present Illness  Agnes Vasquez is a 70 y.o. female with h/o IBS      Previous GI workup:  Has been seen by Dr. Gaby Méndez at [facility]. Last visit was on June 2022.  The visit was a consultation prior to proceeding with a screening colonoscopy.    Colonoscopy was done on 6/30/22. Pt was asymptomatic at that time. Results showed diverticulosis, non bleeding internal hemorrhoids. No specimens collected. It was recommended she repeat surveillance in 5 yr s(2027).    Had EGD back in 12/2015 secondary to dyspepsia and weight loss. It showed 2 cm HH, gastritis (bx normal, no alteration) , normal duodenum (bx normal, no alteration)  A Colonoscopy was also done on same day. It showed diverticulosis and internal hemorrhoids. No specimens collected.     First screening colonoscopy was on 7/2013 and there were no polyps found in that exam. Just diverticulosis and hemorrhoids.    In the past, GI at [facility] did check CBC,  TSH and celiac panel secondary to her concerns of lower abdominal pain, bloating, loose stools and weight loss. All labs were normal except for mild  Leukopenia. Stool studies (cultures, c.diff, ova/parasite and fecal fat) were ordered and normal as well.   She was then started on Benefiber and prescribed Bentyl. After reviewing some notes, these meds did help with her sx.          Past Medical History  She has no past medical history on file.    Surgical History  She has a past surgical history that includes Breast biopsy (Left).     Social History  She reports that she has never smoked. She has never used smokeless tobacco. She reports current alcohol use. She reports that she does not use drugs.    Family History  Family History   Family history unknown: Yes        Allergies  Contact metal agent, Meperidine, Mirabegron, and Latex      Review of Systems       There were no vitals taken for this visit.  Physical Exam           Relevant Results      Assessment/Plan:  {Assess/PlanSmartLinks:28435}    Aniya  Telephone Encounter by Kingston Chaves at 67/74/66 05:13 PM     Author:  Uma Garcia, [facility] Service:  (none) Author Type:  Certified Medical Assistant     Filed:  03/17/17 05:13 PM Encounter Date:  3/17/2017 Status:  Signed     :  Uma Garcia, [employer] (Certified Medical Assistant)            Medication refilled per Dr. Chiquita Diane & faxed to pharmacy. [FL1.1T]        Revision History        User Key Date/Time User Provider Type Action    > FL1.1 03/17/17 05:13 PM Hayde Rhoades, [address] Certified Medical Assistant Sign    T - Template DILCIA Baeza PA-C

## 2025-01-29 ENCOUNTER — APPOINTMENT (OUTPATIENT)
Dept: GASTROENTEROLOGY | Facility: HOSPITAL | Age: 70
End: 2025-01-29
Payer: MEDICARE

## 2025-02-05 NOTE — PROGRESS NOTES
Physical Therapy    Physical Therapy Evaluation and Treatment      Patient Name: Agnes Vasquez  MRN: [id number]  Today's Date: 10/23/2023  Time Calculation  Start Time: 1400  Stop Time: 1440  Time Calculation (min): 40 min    Visit: 1    Assessment:  Assessment Comment: Patient presents with signs and symptoms consistent with the medical diagnosis of lumbar radiculopathy. She will benefit from medically necessary skilled physical therapy interventions in order to decrease pain and improve function with daily activities.    Plan:  Treatment/Interventions: Aquatic therapy, Cryotherapy, Dry needling, Education/ Instruction, Electrical stimulation, Hot pack, Manual therapy, Neuromuscular re-education, Therapeutic activities, Therapeutic exercises  PT Plan: Skilled PT  PT Frequency: 1 time per week  Duration: 8 weeks  Onset Date: 23  Number of Treatments Authorized: Auth Required  Rehab Potential: Excellent  Plan of Care Agreement: Patient    Current Problem:   1. Lumbar radiculopathy, right  Follow Up In Physical Therapy          Subjective    General:  General  Reason for Referral: right sided sciatica  Referred By: Trev Lagunas MD  General Comment: Patient is a 67 y/o female who was referred to outpatient physical therapy due to right sided sciatica. Symptoms began a couple years ago and has progressed in the past couple months. Pain in her low back is constant and has intermittent pain radiating down her right leg. Sitting increases her sciatica pain. She rates her pain at 7/10 and describes pain as aching. Pain is the worst when she first wakes up in the morning. She denies any numbness or tingling. Patient reports a past . Patient uses a heating pad for pain relief. She notes it is not very noticeable when she is up and moving during the day. Her x-ray revealed Moderate anterolisthesis of L4 on L5. Severe facet disease lower lumbar spine.  Precautions:   None   Pain:   7/10  Prior Level of  "Function:   Independent with all ADLs.     Objective   Cognition:   WNL  General Assessments:  Posture Comment: Patient presents with sightly forward flexed posture while seated.    Range of Motion Comments: Lumbar ROM  Flexion 100% pain at end ROM  Extension 100%  Right Rotation 100% pain at end ROM  Left Rotation 100%  Right Side Bend 100% pain at end ROM  Left Side Bend 100% pain at end ROM    Strength Comments: LE strength (R/L):  Hip flexion 4+/5, 4+/5  Hip extension 4+/5, 4+/5  Hip abduction 4+/5, 4+/5  Knee flexion 5/5, 5/5  Knee extension 5/5, 5/5  Ankle plantarflexion 5/5, 5/5  Ankle dorsiflexion 5/5, 5/5  Core strength 4/5    Palpation Comment: Patient is tender to palpation along right sided lumbar paraspinals.   Special Tests Comment: Slump positive right, slump negative left,  SLR test negative bilaterally  Functional Assessments:  Gait Comment: Patient ambulates with a normalized gait pattern.    Outcome Measures:  KATHERIN: 20% Disability     Treatments:  Therapeutic Exercise  Therapeutic Exercise Performed: Yes  Therapeutic Exercise Activity 1: LTR 10 x 5\"  Therapeutic Exercise Activity 2: open book 10 x 10\"  Therapeutic Exercise Activity 3: piriformis stretch 3 x 30\"  Therapeutic Exercise Activity 4: supine PPT 20 x 5\"  Therapeutic Exercise Activity 5: bridge 2 x 10 x 3\"  Therapeutic Exercise Activity 6: sciatic nerve glides    Goals:  Active       PT Problem       Patient will report improvement via the KATHERIN to show improved activity tolerance.        Start:  10/23/23    Expected End:  01/21/24            Patient will report compliance with her home exercise program.        Start:  10/23/23    Expected End:  01/21/24            Patient will demonstrate improvements in core and hip strength to 5/5 in all motions to decrease pain with weight bearing activity.        Start:  10/23/23    Expected End:  01/21/24                      " Adult

## 2025-02-06 DIAGNOSIS — Z00.00 HEALTH CARE MAINTENANCE: ICD-10-CM

## 2025-02-06 RX ORDER — HYDROCORTISONE 25 MG/ML
1 LOTION TOPICAL DAILY
Qty: 15 ML | Refills: 0 | Status: CANCELLED | OUTPATIENT
Start: 2025-02-06

## 2025-02-08 RX ORDER — MELOXICAM 7.5 MG/1
7.5 TABLET ORAL DAILY PRN
Qty: 30 TABLET | Refills: 0 | Status: SHIPPED | OUTPATIENT
Start: 2025-02-08 | End: 2025-03-07

## 2025-02-08 RX ORDER — HYDROCORTISONE 25 MG/G
OINTMENT TOPICAL 2 TIMES DAILY PRN
Qty: 30 G | Refills: 0 | Status: SHIPPED | OUTPATIENT
Start: 2025-02-08 | End: 2026-02-08

## 2025-03-03 DIAGNOSIS — L80 VITILIGO: Primary | ICD-10-CM

## 2025-03-06 RX ORDER — RUXOLITINIB 15 MG/G
CREAM TOPICAL
Qty: 60 G | Refills: 11 | Status: ON HOLD | OUTPATIENT
Start: 2025-03-06

## 2025-03-07 ENCOUNTER — CLINICAL SUPPORT (OUTPATIENT)
Dept: EMERGENCY MEDICINE | Facility: HOSPITAL | Age: 70
DRG: 194 | End: 2025-03-07
Payer: MEDICARE

## 2025-03-07 ENCOUNTER — TELEPHONE (OUTPATIENT)
Dept: PRIMARY CARE | Facility: CLINIC | Age: 70
End: 2025-03-07
Payer: MEDICARE

## 2025-03-07 ENCOUNTER — APPOINTMENT (OUTPATIENT)
Dept: RADIOLOGY | Facility: HOSPITAL | Age: 70
DRG: 194 | End: 2025-03-07
Payer: MEDICARE

## 2025-03-07 ENCOUNTER — HOSPITAL ENCOUNTER (INPATIENT)
Facility: HOSPITAL | Age: 70
End: 2025-03-07
Attending: EMERGENCY MEDICINE | Admitting: INTERNAL MEDICINE
Payer: MEDICARE

## 2025-03-07 DIAGNOSIS — R06.02 SHORTNESS OF BREATH: ICD-10-CM

## 2025-03-07 DIAGNOSIS — J18.9 PNEUMONIA DUE TO INFECTIOUS ORGANISM, UNSPECIFIED LATERALITY, UNSPECIFIED PART OF LUNG: ICD-10-CM

## 2025-03-07 DIAGNOSIS — J15.9 COMMUNITY ACQUIRED BACTERIAL PNEUMONIA: Primary | ICD-10-CM

## 2025-03-07 LAB
ANION GAP SERPL CALC-SCNC: 16 MMOL/L (ref 10–20)
ATRIAL RATE: 95 BPM
BASOPHILS # BLD AUTO: 0.03 X10*3/UL (ref 0–0.1)
BASOPHILS NFR BLD AUTO: 0.4 %
BUN SERPL-MCNC: 11 MG/DL (ref 6–23)
CALCIUM SERPL-MCNC: 9.1 MG/DL (ref 8.6–10.6)
CARDIAC TROPONIN I PNL SERPL HS: 28 NG/L (ref 0–34)
CARDIAC TROPONIN I PNL SERPL HS: 31 NG/L (ref 0–34)
CHLORIDE SERPL-SCNC: 103 MMOL/L (ref 98–107)
CO2 SERPL-SCNC: 24 MMOL/L (ref 21–32)
CREAT SERPL-MCNC: 0.91 MG/DL (ref 0.5–1.05)
D DIMER PPP FEU-MCNC: 2816 NG/ML FEU
EGFRCR SERPLBLD CKD-EPI 2021: 68 ML/MIN/1.73M*2
EOSINOPHIL # BLD AUTO: 0 X10*3/UL (ref 0–0.7)
EOSINOPHIL NFR BLD AUTO: 0 %
ERYTHROCYTE [DISTWIDTH] IN BLOOD BY AUTOMATED COUNT: 14.6 % (ref 11.5–14.5)
FLUAV RNA RESP QL NAA+PROBE: NOT DETECTED
FLUBV RNA RESP QL NAA+PROBE: NOT DETECTED
GLUCOSE SERPL-MCNC: 101 MG/DL (ref 74–99)
HCT VFR BLD AUTO: 39.7 % (ref 36–46)
HGB BLD-MCNC: 13.8 G/DL (ref 12–16)
HOLD SPECIMEN: NORMAL
HOLD SPECIMEN: NORMAL
IMM GRANULOCYTES # BLD AUTO: 0.02 X10*3/UL (ref 0–0.7)
IMM GRANULOCYTES NFR BLD AUTO: 0.3 % (ref 0–0.9)
LACTATE SERPL-SCNC: 1.3 MMOL/L (ref 0.4–2)
LYMPHOCYTES # BLD AUTO: 1.2 X10*3/UL (ref 1.2–4.8)
LYMPHOCYTES NFR BLD AUTO: 17.7 %
MCH RBC QN AUTO: 30.2 PG (ref 26–34)
MCHC RBC AUTO-ENTMCNC: 34.8 G/DL (ref 32–36)
MCV RBC AUTO: 87 FL (ref 80–100)
MONOCYTES # BLD AUTO: 0.39 X10*3/UL (ref 0.1–1)
MONOCYTES NFR BLD AUTO: 5.8 %
NEUTROPHILS # BLD AUTO: 5.13 X10*3/UL (ref 1.2–7.7)
NEUTROPHILS NFR BLD AUTO: 75.8 %
NRBC BLD-RTO: 0 /100 WBCS (ref 0–0)
P AXIS: 73 DEGREES
P OFFSET: 200 MS
P ONSET: 154 MS
PLATELET # BLD AUTO: 146 X10*3/UL (ref 150–450)
POTASSIUM SERPL-SCNC: 3.7 MMOL/L (ref 3.5–5.3)
PR INTERVAL: 134 MS
Q ONSET: 221 MS
QRS COUNT: 16 BEATS
QRS DURATION: 84 MS
QT INTERVAL: 346 MS
QTC CALCULATION(BAZETT): 434 MS
QTC FREDERICIA: 403 MS
R AXIS: 56 DEGREES
RBC # BLD AUTO: 4.57 X10*6/UL (ref 4–5.2)
SARS-COV-2 RNA RESP QL NAA+PROBE: NOT DETECTED
SODIUM SERPL-SCNC: 139 MMOL/L (ref 136–145)
T AXIS: 34 DEGREES
T OFFSET: 394 MS
VENTRICULAR RATE: 95 BPM
WBC # BLD AUTO: 6.8 X10*3/UL (ref 4.4–11.3)

## 2025-03-07 PROCEDURE — 85025 COMPLETE CBC W/AUTO DIFF WBC: CPT

## 2025-03-07 PROCEDURE — 36415 COLL VENOUS BLD VENIPUNCTURE: CPT

## 2025-03-07 PROCEDURE — 71046 X-RAY EXAM CHEST 2 VIEWS: CPT | Performed by: RADIOLOGY

## 2025-03-07 PROCEDURE — 83605 ASSAY OF LACTIC ACID: CPT

## 2025-03-07 PROCEDURE — 99285 EMERGENCY DEPT VISIT HI MDM: CPT | Performed by: EMERGENCY MEDICINE

## 2025-03-07 PROCEDURE — 94640 AIRWAY INHALATION TREATMENT: CPT

## 2025-03-07 PROCEDURE — 87449 NOS EACH ORGANISM AG IA: CPT

## 2025-03-07 PROCEDURE — 71046 X-RAY EXAM CHEST 2 VIEWS: CPT

## 2025-03-07 PROCEDURE — 93005 ELECTROCARDIOGRAM TRACING: CPT

## 2025-03-07 PROCEDURE — 1200000002 HC GENERAL ROOM WITH TELEMETRY DAILY

## 2025-03-07 PROCEDURE — 2500000004 HC RX 250 GENERAL PHARMACY W/ HCPCS (ALT 636 FOR OP/ED)

## 2025-03-07 PROCEDURE — 71275 CT ANGIOGRAPHY CHEST: CPT | Performed by: RADIOLOGY

## 2025-03-07 PROCEDURE — 71275 CT ANGIOGRAPHY CHEST: CPT

## 2025-03-07 PROCEDURE — 2550000001 HC RX 255 CONTRASTS: Performed by: EMERGENCY MEDICINE

## 2025-03-07 PROCEDURE — 87899 AGENT NOS ASSAY W/OPTIC: CPT

## 2025-03-07 PROCEDURE — 93010 ELECTROCARDIOGRAM REPORT: CPT | Performed by: EMERGENCY MEDICINE

## 2025-03-07 PROCEDURE — 96365 THER/PROPH/DIAG IV INF INIT: CPT

## 2025-03-07 PROCEDURE — 99285 EMERGENCY DEPT VISIT HI MDM: CPT | Mod: 25 | Performed by: EMERGENCY MEDICINE

## 2025-03-07 PROCEDURE — 2500000002 HC RX 250 W HCPCS SELF ADMINISTERED DRUGS (ALT 637 FOR MEDICARE OP, ALT 636 FOR OP/ED)

## 2025-03-07 PROCEDURE — 84484 ASSAY OF TROPONIN QUANT: CPT

## 2025-03-07 PROCEDURE — 2500000001 HC RX 250 WO HCPCS SELF ADMINISTERED DRUGS (ALT 637 FOR MEDICARE OP)

## 2025-03-07 PROCEDURE — 87636 SARSCOV2 & INF A&B AMP PRB: CPT | Performed by: EMERGENCY MEDICINE

## 2025-03-07 PROCEDURE — 84520 ASSAY OF UREA NITROGEN: CPT

## 2025-03-07 PROCEDURE — 96367 TX/PROPH/DG ADDL SEQ IV INF: CPT

## 2025-03-07 PROCEDURE — 85379 FIBRIN DEGRADATION QUANT: CPT

## 2025-03-07 RX ORDER — MELOXICAM 7.5 MG/1
7.5 TABLET ORAL EVERY 8 HOURS PRN
Status: DISCONTINUED | OUTPATIENT
Start: 2025-03-07 | End: 2025-03-07 | Stop reason: SDUPTHER

## 2025-03-07 RX ORDER — ACETAMINOPHEN 325 MG/1
975 TABLET ORAL EVERY 8 HOURS
Status: DISPENSED | OUTPATIENT
Start: 2025-03-07 | End: 2025-03-08

## 2025-03-07 RX ORDER — POLYETHYLENE GLYCOL 3350 17 G/17G
17 POWDER, FOR SOLUTION ORAL DAILY PRN
Status: DISCONTINUED | OUTPATIENT
Start: 2025-03-07 | End: 2025-03-09

## 2025-03-07 RX ORDER — ENOXAPARIN SODIUM 100 MG/ML
40 INJECTION SUBCUTANEOUS EVERY 24 HOURS
Status: DISPENSED | OUTPATIENT
Start: 2025-03-07

## 2025-03-07 RX ORDER — CEFTRIAXONE 2 G/50ML
2 INJECTION, SOLUTION INTRAVENOUS EVERY 24 HOURS
Status: DISCONTINUED | OUTPATIENT
Start: 2025-03-07 | End: 2025-03-08

## 2025-03-07 RX ORDER — IPRATROPIUM BROMIDE AND ALBUTEROL SULFATE 2.5; .5 MG/3ML; MG/3ML
3 SOLUTION RESPIRATORY (INHALATION)
Status: DISCONTINUED | OUTPATIENT
Start: 2025-03-07 | End: 2025-03-09

## 2025-03-07 RX ORDER — MELOXICAM 7.5 MG/1
7.5 TABLET ORAL DAILY
Status: DISPENSED | OUTPATIENT
Start: 2025-03-07

## 2025-03-07 RX ADMIN — IPRATROPIUM BROMIDE AND ALBUTEROL SULFATE 3 ML: .5; 3 SOLUTION RESPIRATORY (INHALATION) at 14:02

## 2025-03-07 RX ADMIN — IPRATROPIUM BROMIDE AND ALBUTEROL SULFATE 3 ML: .5; 3 SOLUTION RESPIRATORY (INHALATION) at 20:23

## 2025-03-07 RX ADMIN — ENOXAPARIN SODIUM 40 MG: 100 INJECTION SUBCUTANEOUS at 22:01

## 2025-03-07 RX ADMIN — CEFTRIAXONE SODIUM 2 G: 2 INJECTION, SOLUTION INTRAVENOUS at 15:23

## 2025-03-07 RX ADMIN — IOHEXOL 67 ML: 350 INJECTION, SOLUTION INTRAVENOUS at 17:08

## 2025-03-07 RX ADMIN — AZITHROMYCIN 500 MG: 500 INJECTION, POWDER, LYOPHILIZED, FOR SOLUTION INTRAVENOUS at 17:30

## 2025-03-07 RX ADMIN — ACETAMINOPHEN 975 MG: 325 TABLET ORAL at 22:01

## 2025-03-07 ASSESSMENT — PAIN - FUNCTIONAL ASSESSMENT: PAIN_FUNCTIONAL_ASSESSMENT: 0-10

## 2025-03-07 ASSESSMENT — COLUMBIA-SUICIDE SEVERITY RATING SCALE - C-SSRS
1. IN THE PAST MONTH, HAVE YOU WISHED YOU WERE DEAD OR WISHED YOU COULD GO TO SLEEP AND NOT WAKE UP?: NO
2. HAVE YOU ACTUALLY HAD ANY THOUGHTS OF KILLING YOURSELF?: NO
6. HAVE YOU EVER DONE ANYTHING, STARTED TO DO ANYTHING, OR PREPARED TO DO ANYTHING TO END YOUR LIFE?: NO

## 2025-03-07 ASSESSMENT — PAIN SCALES - GENERAL: PAINLEVEL_OUTOF10: 6

## 2025-03-07 NOTE — ED PROVIDER NOTES
"HPI   Chief Complaint   Patient presents with    Chest Pain    Shortness of Breath       HPI  Ms Vasquez is a 70 Y.O.F who presents to the ED today for SOB + chest pain. Patient reports that she developed a cold last Sunday which she was treating with OTC meds. However, a few days ago she developed a productive cough (green sputum, was once blood tinged), SOB, and chest pain. Patient says the chest pain is mainly when she is coughing; however, sometimes at rest. Patient says \"it feels like my lungs can get enough air.\" Denies recent sick contacts. No fevers. Endorses chills and would use a heating pad. Denies nausea and vomiting but has had multiple episodes of diarrhea a day for the past couple of days. Denies abdominal pain.       Patient History   No past medical history on file.  Past Surgical History:   Procedure Laterality Date    BREAST BIOPSY Left     excisional benign bx     Family History   Family history unknown: Yes     Social History     Tobacco Use    Smoking status: Never    Smokeless tobacco: Never   Substance Use Topics    Alcohol use: Yes    Drug use: Never       Physical Exam   ED Triage Vitals [03/07/25 1102]   Temperature Heart Rate Respirations BP   36.6 °C (97.9 °F) (!) 110 20 (!) 151/98      Pulse Ox Temp src Heart Rate Source Patient Position   (!) 93 % -- -- --      BP Location FiO2 (%)     -- --       Physical Exam  Constitutional:       Appearance: She is well-developed. She is not ill-appearing.   HENT:      Head: Normocephalic and atraumatic.   Cardiovascular:      Rate and Rhythm: Normal rate.      Heart sounds: Normal heart sounds.   Pulmonary:      Effort: Tachypnea present.      Breath sounds: Wheezing present.   Abdominal:      General: Bowel sounds are normal.      Palpations: Abdomen is soft.      Tenderness: There is no abdominal tenderness.   Musculoskeletal:      Cervical back: Normal range of motion.   Skin:     General: Skin is warm and dry.   Neurological:      General: No " focal deficit present.      Mental Status: She is alert.           ED Course & MDM   #SOB  #Productive Cough  #Chest pain  Ddx: PE, PNA   -EKG  -Trops: wnl   -D dimer: 2,816 --> CT angio chest for PE:   -CXR: Emphysematous changes in the lungs without acute abnormality   -SARS, COVID , negative   -duo nebs for symptomatic relief   -with new onset low grade fever 100.4 and hypoxia to 88%, will start patient on ceftriaxone/azithromycin    Patient was signed out to Dr. Vines at 3pm, please refer to his note for further workup and final disposition of patient.      Karson Rutledge MD  Resident  03/07/25 1501       Karson Rutledge MD  Resident  03/07/25 9568

## 2025-03-07 NOTE — ED TRIAGE NOTES
Presents with CP, SOB and productive cough. States it started last week but has progressively gotten worse.

## 2025-03-08 LAB
ALBUMIN SERPL BCP-MCNC: 3.8 G/DL (ref 3.4–5)
ALBUMIN SERPL BCP-MCNC: 4 G/DL (ref 3.4–5)
ANION GAP SERPL CALC-SCNC: 13 MMOL/L (ref 10–20)
ANION GAP SERPL CALC-SCNC: 13 MMOL/L (ref 10–20)
BASOPHILS # BLD AUTO: 0.02 X10*3/UL (ref 0–0.1)
BASOPHILS # BLD AUTO: 0.05 X10*3/UL (ref 0–0.1)
BASOPHILS NFR BLD AUTO: 0.3 %
BASOPHILS NFR BLD AUTO: 0.6 %
BUN SERPL-MCNC: 12 MG/DL (ref 6–23)
BUN SERPL-MCNC: 9 MG/DL (ref 6–23)
CALCIUM SERPL-MCNC: 8.4 MG/DL (ref 8.6–10.6)
CALCIUM SERPL-MCNC: 9 MG/DL (ref 8.6–10.6)
CHLORIDE SERPL-SCNC: 101 MMOL/L (ref 98–107)
CHLORIDE SERPL-SCNC: 103 MMOL/L (ref 98–107)
CO2 SERPL-SCNC: 25 MMOL/L (ref 21–32)
CO2 SERPL-SCNC: 26 MMOL/L (ref 21–32)
CREAT SERPL-MCNC: 0.84 MG/DL (ref 0.5–1.05)
CREAT SERPL-MCNC: 0.84 MG/DL (ref 0.5–1.05)
EGFRCR SERPLBLD CKD-EPI 2021: 75 ML/MIN/1.73M*2
EGFRCR SERPLBLD CKD-EPI 2021: 75 ML/MIN/1.73M*2
EOSINOPHIL # BLD AUTO: 0 X10*3/UL (ref 0–0.7)
EOSINOPHIL # BLD AUTO: 0.02 X10*3/UL (ref 0–0.7)
EOSINOPHIL NFR BLD AUTO: 0 %
EOSINOPHIL NFR BLD AUTO: 0.2 %
ERYTHROCYTE [DISTWIDTH] IN BLOOD BY AUTOMATED COUNT: 14.4 % (ref 11.5–14.5)
ERYTHROCYTE [DISTWIDTH] IN BLOOD BY AUTOMATED COUNT: 14.5 % (ref 11.5–14.5)
GLUCOSE SERPL-MCNC: 125 MG/DL (ref 74–99)
GLUCOSE SERPL-MCNC: 149 MG/DL (ref 74–99)
HCT VFR BLD AUTO: 34.7 % (ref 36–46)
HCT VFR BLD AUTO: 35.6 % (ref 36–46)
HGB BLD-MCNC: 12.2 G/DL (ref 12–16)
HGB BLD-MCNC: 12.2 G/DL (ref 12–16)
IMM GRANULOCYTES # BLD AUTO: 0.04 X10*3/UL (ref 0–0.7)
IMM GRANULOCYTES # BLD AUTO: 0.1 X10*3/UL (ref 0–0.7)
IMM GRANULOCYTES NFR BLD AUTO: 0.5 % (ref 0–0.9)
IMM GRANULOCYTES NFR BLD AUTO: 1.3 % (ref 0–0.9)
LEGIONELLA AG UR QL: NEGATIVE
LYMPHOCYTES # BLD AUTO: 1.65 X10*3/UL (ref 1.2–4.8)
LYMPHOCYTES # BLD AUTO: 1.69 X10*3/UL (ref 1.2–4.8)
LYMPHOCYTES NFR BLD AUTO: 19.6 %
LYMPHOCYTES NFR BLD AUTO: 21.5 %
MAGNESIUM SERPL-MCNC: 2.19 MG/DL (ref 1.6–2.4)
MCH RBC QN AUTO: 29.5 PG (ref 26–34)
MCH RBC QN AUTO: 30.1 PG (ref 26–34)
MCHC RBC AUTO-ENTMCNC: 34.3 G/DL (ref 32–36)
MCHC RBC AUTO-ENTMCNC: 35.2 G/DL (ref 32–36)
MCV RBC AUTO: 86 FL (ref 80–100)
MCV RBC AUTO: 86 FL (ref 80–100)
MONOCYTES # BLD AUTO: 0.36 X10*3/UL (ref 0.1–1)
MONOCYTES # BLD AUTO: 0.44 X10*3/UL (ref 0.1–1)
MONOCYTES NFR BLD AUTO: 4.2 %
MONOCYTES NFR BLD AUTO: 5.7 %
NEUTROPHILS # BLD AUTO: 5.45 X10*3/UL (ref 1.2–7.7)
NEUTROPHILS # BLD AUTO: 6.47 X10*3/UL (ref 1.2–7.7)
NEUTROPHILS NFR BLD AUTO: 71.2 %
NEUTROPHILS NFR BLD AUTO: 74.9 %
NRBC BLD-RTO: 0 /100 WBCS (ref 0–0)
NRBC BLD-RTO: 0 /100 WBCS (ref 0–0)
PHOSPHATE SERPL-MCNC: 3 MG/DL (ref 2.5–4.9)
PHOSPHATE SERPL-MCNC: 3.5 MG/DL (ref 2.5–4.9)
PLATELET # BLD AUTO: 117 X10*3/UL (ref 150–450)
PLATELET # BLD AUTO: 126 X10*3/UL (ref 150–450)
POTASSIUM SERPL-SCNC: 3.3 MMOL/L (ref 3.5–5.3)
POTASSIUM SERPL-SCNC: 3.8 MMOL/L (ref 3.5–5.3)
PROCALCITONIN SERPL-MCNC: 0.53 NG/ML
RBC # BLD AUTO: 4.05 X10*6/UL (ref 4–5.2)
RBC # BLD AUTO: 4.13 X10*6/UL (ref 4–5.2)
RBC MORPH BLD: NORMAL
RBC MORPH BLD: NORMAL
S PNEUM AG UR QL: POSITIVE
SODIUM SERPL-SCNC: 137 MMOL/L (ref 136–145)
SODIUM SERPL-SCNC: 137 MMOL/L (ref 136–145)
WBC # BLD AUTO: 7.7 X10*3/UL (ref 4.4–11.3)
WBC # BLD AUTO: 8.6 X10*3/UL (ref 4.4–11.3)

## 2025-03-08 PROCEDURE — 87493 C DIFF AMPLIFIED PROBE: CPT

## 2025-03-08 PROCEDURE — 84145 PROCALCITONIN (PCT): CPT

## 2025-03-08 PROCEDURE — 36415 COLL VENOUS BLD VENIPUNCTURE: CPT

## 2025-03-08 PROCEDURE — 85025 COMPLETE CBC W/AUTO DIFF WBC: CPT

## 2025-03-08 PROCEDURE — 99223 1ST HOSP IP/OBS HIGH 75: CPT | Performed by: INTERNAL MEDICINE

## 2025-03-08 PROCEDURE — 2500000004 HC RX 250 GENERAL PHARMACY W/ HCPCS (ALT 636 FOR OP/ED)

## 2025-03-08 PROCEDURE — 2500000001 HC RX 250 WO HCPCS SELF ADMINISTERED DRUGS (ALT 637 FOR MEDICARE OP)

## 2025-03-08 PROCEDURE — 87040 BLOOD CULTURE FOR BACTERIA: CPT

## 2025-03-08 PROCEDURE — 80069 RENAL FUNCTION PANEL: CPT

## 2025-03-08 PROCEDURE — 1210000001 HC SEMI-PRIVATE ROOM DAILY

## 2025-03-08 PROCEDURE — 83735 ASSAY OF MAGNESIUM: CPT

## 2025-03-08 PROCEDURE — 2500000002 HC RX 250 W HCPCS SELF ADMINISTERED DRUGS (ALT 637 FOR MEDICARE OP, ALT 636 FOR OP/ED)

## 2025-03-08 PROCEDURE — 92610 EVALUATE SWALLOWING FUNCTION: CPT | Mod: GN | Performed by: SPEECH-LANGUAGE PATHOLOGIST

## 2025-03-08 RX ORDER — PSYLLIUM HUSK 0.4 G
1 CAPSULE ORAL DAILY
Status: DISPENSED | OUTPATIENT
Start: 2025-03-08

## 2025-03-08 RX ORDER — CEFTRIAXONE 1 G/50ML
1 INJECTION, SOLUTION INTRAVENOUS EVERY 24 HOURS
Status: DISCONTINUED | OUTPATIENT
Start: 2025-03-08 | End: 2025-03-09

## 2025-03-08 RX ORDER — ASPIRIN 81 MG/1
81 TABLET ORAL DAILY
Status: DISPENSED | OUTPATIENT
Start: 2025-03-08

## 2025-03-08 RX ADMIN — ASPIRIN 81 MG: 81 TABLET, COATED ORAL at 08:08

## 2025-03-08 RX ADMIN — IPRATROPIUM BROMIDE AND ALBUTEROL SULFATE 3 ML: .5; 3 SOLUTION RESPIRATORY (INHALATION) at 06:23

## 2025-03-08 RX ADMIN — Medication 1 TABLET: at 08:08

## 2025-03-08 RX ADMIN — IPRATROPIUM BROMIDE AND ALBUTEROL SULFATE 3 ML: .5; 3 SOLUTION RESPIRATORY (INHALATION) at 13:15

## 2025-03-08 RX ADMIN — AZITHROMYCIN 500 MG: 500 INJECTION, POWDER, LYOPHILIZED, FOR SOLUTION INTRAVENOUS at 17:38

## 2025-03-08 RX ADMIN — ENOXAPARIN SODIUM 40 MG: 100 INJECTION SUBCUTANEOUS at 20:37

## 2025-03-08 RX ADMIN — MELOXICAM 7.5 MG: 7.5 TABLET ORAL at 04:35

## 2025-03-08 RX ADMIN — CEFTRIAXONE SODIUM 1 G: 1 INJECTION, SOLUTION INTRAVENOUS at 16:08

## 2025-03-08 SDOH — SOCIAL STABILITY: SOCIAL INSECURITY: DOES ANYONE TRY TO KEEP YOU FROM HAVING/CONTACTING OTHER FRIENDS OR DOING THINGS OUTSIDE YOUR HOME?: NO

## 2025-03-08 SDOH — ECONOMIC STABILITY: FOOD INSECURITY: WITHIN THE PAST 12 MONTHS, YOU WORRIED THAT YOUR FOOD WOULD RUN OUT BEFORE YOU GOT THE MONEY TO BUY MORE.: NEVER TRUE

## 2025-03-08 SDOH — SOCIAL STABILITY: SOCIAL INSECURITY: ARE THERE ANY APPARENT SIGNS OF INJURIES/BEHAVIORS THAT COULD BE RELATED TO ABUSE/NEGLECT?: NO

## 2025-03-08 SDOH — HEALTH STABILITY: MENTAL HEALTH: HOW MANY DRINKS CONTAINING ALCOHOL DO YOU HAVE ON A TYPICAL DAY WHEN YOU ARE DRINKING?: 1 OR 2

## 2025-03-08 SDOH — SOCIAL STABILITY: SOCIAL INSECURITY: WITHIN THE LAST YEAR, HAVE YOU BEEN HUMILIATED OR EMOTIONALLY ABUSED IN OTHER WAYS BY YOUR PARTNER OR EX-PARTNER?: NO

## 2025-03-08 SDOH — HEALTH STABILITY: MENTAL HEALTH: HOW OFTEN DO YOU HAVE A DRINK CONTAINING ALCOHOL?: MONTHLY OR LESS

## 2025-03-08 SDOH — ECONOMIC STABILITY: HOUSING INSECURITY: IN THE LAST 12 MONTHS, WAS THERE A TIME WHEN YOU WERE NOT ABLE TO PAY THE MORTGAGE OR RENT ON TIME?: NO

## 2025-03-08 SDOH — SOCIAL STABILITY: SOCIAL INSECURITY
WITHIN THE LAST YEAR, HAVE YOU BEEN KICKED, HIT, SLAPPED, OR OTHERWISE PHYSICALLY HURT BY YOUR PARTNER OR EX-PARTNER?: NO

## 2025-03-08 SDOH — ECONOMIC STABILITY: INCOME INSECURITY: IN THE PAST 12 MONTHS HAS THE ELECTRIC, GAS, OIL, OR WATER COMPANY THREATENED TO SHUT OFF SERVICES IN YOUR HOME?: NO

## 2025-03-08 SDOH — SOCIAL STABILITY: SOCIAL INSECURITY: WERE YOU ABLE TO COMPLETE ALL THE BEHAVIORAL HEALTH SCREENINGS?: YES

## 2025-03-08 SDOH — SOCIAL STABILITY: SOCIAL INSECURITY
WITHIN THE LAST YEAR, HAVE YOU BEEN RAPED OR FORCED TO HAVE ANY KIND OF SEXUAL ACTIVITY BY YOUR PARTNER OR EX-PARTNER?: NO

## 2025-03-08 SDOH — SOCIAL STABILITY: SOCIAL INSECURITY: DO YOU FEEL UNSAFE GOING BACK TO THE PLACE WHERE YOU ARE LIVING?: NO

## 2025-03-08 SDOH — ECONOMIC STABILITY: FOOD INSECURITY: WITHIN THE PAST 12 MONTHS, THE FOOD YOU BOUGHT JUST DIDN'T LAST AND YOU DIDN'T HAVE MONEY TO GET MORE.: NEVER TRUE

## 2025-03-08 SDOH — SOCIAL STABILITY: SOCIAL INSECURITY: HAVE YOU HAD THOUGHTS OF HARMING ANYONE ELSE?: NO

## 2025-03-08 SDOH — SOCIAL STABILITY: SOCIAL INSECURITY: ABUSE: ADULT

## 2025-03-08 SDOH — HEALTH STABILITY: MENTAL HEALTH: HOW OFTEN DO YOU HAVE SIX OR MORE DRINKS ON ONE OCCASION?: NEVER

## 2025-03-08 SDOH — SOCIAL STABILITY: SOCIAL INSECURITY: HAS ANYONE EVER THREATENED TO HURT YOUR FAMILY OR YOUR PETS?: NO

## 2025-03-08 SDOH — SOCIAL STABILITY: SOCIAL INSECURITY: ARE YOU OR HAVE YOU BEEN THREATENED OR ABUSED PHYSICALLY, EMOTIONALLY, OR SEXUALLY BY ANYONE?: NO

## 2025-03-08 SDOH — SOCIAL STABILITY: SOCIAL INSECURITY: WITHIN THE LAST YEAR, HAVE YOU BEEN AFRAID OF YOUR PARTNER OR EX-PARTNER?: NO

## 2025-03-08 SDOH — ECONOMIC STABILITY: HOUSING INSECURITY: IN THE PAST 12 MONTHS, HOW MANY TIMES HAVE YOU MOVED WHERE YOU WERE LIVING?: 0

## 2025-03-08 SDOH — SOCIAL STABILITY: SOCIAL INSECURITY: DO YOU FEEL ANYONE HAS EXPLOITED OR TAKEN ADVANTAGE OF YOU FINANCIALLY OR OF YOUR PERSONAL PROPERTY?: NO

## 2025-03-08 ASSESSMENT — PAIN SCALES - GENERAL
PAINLEVEL_OUTOF10: 0 - NO PAIN
PAINLEVEL_OUTOF10: 5 - MODERATE PAIN
PAINLEVEL_OUTOF10: 3

## 2025-03-08 ASSESSMENT — ACTIVITIES OF DAILY LIVING (ADL)
JUDGMENT_ADEQUATE_SAFELY_COMPLETE_DAILY_ACTIVITIES: YES
HEARING - LEFT EAR: FUNCTIONAL
WALKS IN HOME: INDEPENDENT
ASSISTIVE_DEVICE: EYEGLASSES
HEARING - RIGHT EAR: FUNCTIONAL
PATIENT'S MEMORY ADEQUATE TO SAFELY COMPLETE DAILY ACTIVITIES?: YES
TOILETING: INDEPENDENT
LACK_OF_TRANSPORTATION: NO
ADEQUATE_TO_COMPLETE_ADL: YES
GROOMING: INDEPENDENT
DRESSING YOURSELF: INDEPENDENT
FEEDING YOURSELF: INDEPENDENT
BATHING: INDEPENDENT

## 2025-03-08 ASSESSMENT — COGNITIVE AND FUNCTIONAL STATUS - GENERAL
DAILY ACTIVITIY SCORE: 24
MOBILITY SCORE: 24
PATIENT BASELINE BEDBOUND: NO
MOBILITY SCORE: 24
DAILY ACTIVITIY SCORE: 24

## 2025-03-08 ASSESSMENT — LIFESTYLE VARIABLES
AUDIT-C TOTAL SCORE: 1
SKIP TO QUESTIONS 9-10: 1

## 2025-03-08 ASSESSMENT — PATIENT HEALTH QUESTIONNAIRE - PHQ9
1. LITTLE INTEREST OR PLEASURE IN DOING THINGS: NOT AT ALL
SUM OF ALL RESPONSES TO PHQ9 QUESTIONS 1 & 2: 0
2. FEELING DOWN, DEPRESSED OR HOPELESS: NOT AT ALL

## 2025-03-08 ASSESSMENT — PAIN - FUNCTIONAL ASSESSMENT
PAIN_FUNCTIONAL_ASSESSMENT: 0-10

## 2025-03-08 ASSESSMENT — PAIN DESCRIPTION - DESCRIPTORS: DESCRIPTORS: ACHING;DISCOMFORT;DULL

## 2025-03-08 NOTE — H&P
History Of Present Illness  Agnes Vasquez is a 70 y.o. female PMHx NICM (?), IBS, anxiety presented to the ED for SOB, cough and chest pain for several days.    Patient states that on Sunday she began to have sxs of a chest cold with HA, chills and nonproductive cough. She used OTC cough medicines w/o relief. Over the last two days her cough has become productive of green phlegm and she has developed pleuritic midsternal chest pain and diarrhea. She feels like it is hard to get a deep breath. Denies N/V, abdominal pain, but endorses loss of taste and lightheadedness as she says she has not eating in the past 2 days. No orthopnea, LE swelling, hx of blood clots. She states she has diffuse muscle pain at baseline (sees rheumatology, normal CK, ESR, CRP) and typically does PT daily. She is very sore from not having done it in two days.    She is also in the process of being evaluated for unintentional weight loss. She endorses 20lbs in the last three months, which she believes it d/t malabsorption. CT A/P and cscope ordered by PCP. CT unremarkable. Has a GI appt end of March. Normal TSH last year.    ED Course:  Vitals: T 102.9, , RR 20, 151/98, SpO2 93%    CBC: wbc 6.8, hgb 13.8, plt 146  Bmp: Na 139, K 3.7, , HCO3 24, BUN 11, Cr 0.91, Ca 9.1  Hs top 31 -> 28  D-dimer 2,816  Flu/COVID PCR -ve    ECG SR, T wave inversions in anterior leads (noted on OSH ECG 2/24)    CXR   IMPRESSION:  1. Emphysematous changes in the lungs without acute abnormality    IMPRESSION:  1. No evidence of acute pulmonary embolism to the segmental level.  2. Multifocal mucous plugging of the lower lobes with associated  patchy ground-glass and consolidative opacities in the lung bases.  Findings are favored to represent aspiration or pneumonia.  3. Focal peribronchial inflammation versus a 6 mm cavitary lesion in  the right upper lobe. Attention on follow-up suggested.  4. Small hiatal hernia.    Interventions: CTX,  azithromycin, duonebs x2    Past Medical History: as noted in HPI    Surgical History  She has a past surgical history that includes Breast biopsy (Left).     Social History  Very remote tobacco use hx. Occasionally drinks alcohol. NO other drug use.    Family History  Family History   Family history unknown: Yes     Allergies  Contact metal agent, Meperidine, Mirabegron, and Latex    Review of Systems  Pertinent ROS as per HPI     Physical Exam   Constitutional: appears to be mildly uncomfortable, frequently shifting position  HEENT: EOMI, no scleral icterus, MMM  CV: tachycardic, but regular, no m/r/g, radial pulses 2+  Pulm: non-labored breathing, speaking in full sentences, coarse breath sounds diffusely  GI: Soft, NT, ND  Extremities: no notable edema  Skin: warm  Neuro: grossly alert and oriented  Psych: Mood and affect appropriate to situation      Last Recorded Vitals  /83 (BP Location: Right arm, Patient Position: Lying)   Pulse (!) 101   Temp (!) 39.4 °C (102.9 °F) (Oral)   Resp 16   Wt 59 kg (130 lb)   SpO2 (!) 91% Comment: rn notified    Relevant Results  Results for orders placed or performed during the hospital encounter of 03/07/25 (from the past 24 hours)   Sars-CoV-2 and Influenza A/B PCR   Result Value Ref Range    Flu A Result Not Detected Not Detected    Flu B Result Not Detected Not Detected    Coronavirus 2019, PCR Not Detected Not Detected   D-Dimer, VTE Exclusion   Result Value Ref Range    D-Dimer, Quantitative VTE Exclusion 2,816 (H) <=500 ng/mL FEU   Basic metabolic panel   Result Value Ref Range    Glucose 101 (H) 74 - 99 mg/dL    Sodium 139 136 - 145 mmol/L    Potassium 3.7 3.5 - 5.3 mmol/L    Chloride 103 98 - 107 mmol/L    Bicarbonate 24 21 - 32 mmol/L    Anion Gap 16 10 - 20 mmol/L    Urea Nitrogen 11 6 - 23 mg/dL    Creatinine 0.91 0.50 - 1.05 mg/dL    eGFR 68 >60 mL/min/1.73m*2    Calcium 9.1 8.6 - 10.6 mg/dL   CBC and Auto Differential   Result Value Ref Range    WBC 6.8  4.4 - 11.3 x10*3/uL    nRBC 0.0 0.0 - 0.0 /100 WBCs    RBC 4.57 4.00 - 5.20 x10*6/uL    Hemoglobin 13.8 12.0 - 16.0 g/dL    Hematocrit 39.7 36.0 - 46.0 %    MCV 87 80 - 100 fL    MCH 30.2 26.0 - 34.0 pg    MCHC 34.8 32.0 - 36.0 g/dL    RDW 14.6 (H) 11.5 - 14.5 %    Platelets 146 (L) 150 - 450 x10*3/uL    Neutrophils % 75.8 40.0 - 80.0 %    Immature Granulocytes %, Automated 0.3 0.0 - 0.9 %    Lymphocytes % 17.7 13.0 - 44.0 %    Monocytes % 5.8 2.0 - 10.0 %    Eosinophils % 0.0 0.0 - 6.0 %    Basophils % 0.4 0.0 - 2.0 %    Neutrophils Absolute 5.13 1.20 - 7.70 x10*3/uL    Immature Granulocytes Absolute, Automated 0.02 0.00 - 0.70 x10*3/uL    Lymphocytes Absolute 1.20 1.20 - 4.80 x10*3/uL    Monocytes Absolute 0.39 0.10 - 1.00 x10*3/uL    Eosinophils Absolute 0.00 0.00 - 0.70 x10*3/uL    Basophils Absolute 0.03 0.00 - 0.10 x10*3/uL   Troponin I, High Sensitivity, Initial   Result Value Ref Range    Troponin I, High Sensitivity (CMC) 31 0 - 34 ng/L   Lavender Top   Result Value Ref Range    Extra Tube Hold for add-ons.    Troponin, High Sensitivity, 1 Hour   Result Value Ref Range    Troponin I, High Sensitivity (CMC) 28 0 - 34 ng/L   ECG 12 lead   Result Value Ref Range    Ventricular Rate 95 BPM    Atrial Rate 95 BPM    AL Interval 134 ms    QRS Duration 84 ms    QT Interval 346 ms    QTC Calculation(Bazett) 434 ms    P Axis 73 degrees    R Axis 56 degrees    T Axis 34 degrees    QRS Count 16 beats    Q Onset 221 ms    P Onset 154 ms    P Offset 200 ms    T Offset 394 ms    QTC Fredericia 403 ms   SST TOP   Result Value Ref Range    Extra Tube Hold for add-ons.          Assessment/Plan   Assessment & Plan  Community acquired bacterial pneumonia      70 y.o. female PMHx NICM, IBS, anxiety presented to the ED for SOB, productive cough and chest pain for several days, found to be tachycardic, febrile, and midly hypoxic with CT showing multifocal mucous plugging of the lower lobes with associated patchy ground-glass and  consolidative opacities in the lung bases concerning for CAP. Will continue CTX, azithromycin started in ED.     CT also notes focal peribronchial inflammation versus a 6 mm cavitary lesion in the right upper lobe. More likely to be s/t infectious process, however patient does have a hx of unexplained weight loss and follow-up imaging should likely be pursued once PNA has resolved.    #SOB, CP, productive cough, fever  #CAP   :: CT PE w/ multifocal mucous plugging of the lower lobes with associated  patchy ground-glass and consolidative opacities in the lung bases. Findings are favored to represent aspiration or pneumonia.  Focal peribronchial inflammation versus a 6 mm cavitary lesion in the right upper lobe.   -will send strep/legionella UA  -continue CTX and azithromycin  -duonebs q6 prn  -will schedule tylenol for 2 doses for fever  -consider SLP eval in AM to r/o aspiration    #Tachycardia  :: ECG ST, likely s/t dehydration and mild hypoxia  -patient declined IVF, prefers to increase PO intake    #Weight loss  :: endorses 20lbs in the last three months, which she believes it d/t malabsorption. CT A/P and cscope ordered by PCP. CT unremarkable. Has a GI appt end of March. Normal TSH last year.  :: unexplained weight loss in combination with possible cavitary lesion on CT warrants further evaluation with serial CTs to assess for progression (no previous chest imaging)  -needs follow-up CT once PNA has resolved  -f/up GI and PCP outpatient    DVT ppx: lovenox  Code: DNR/DNI (confirmed on admission)  NOK: Mary Ann Vasquez (Daughter)  [phone]    Patient to be staffed with attending in AM.    Maylin Julio MD  IM, PGY 2

## 2025-03-08 NOTE — PROGRESS NOTES
"  SLP Adult Inpatient Speech-Language Pathology Clinical Swallow Evaluation    Patient Name: Agnes Vasquez  MRN: [id number]  Today's Date: 3/8/2025   Time Calculation  Start Time: 1417  Stop Time: 1427  Time Calculation (min): 10 min           Assessment/Plan:  #? dysphagia  SLP consulted in setting of suspected infectious lung process.   Overall, bedside exam was unremarkable for dysphagia and clinical indicators of aspiration.        Recommendations:  Regular Solids & thin liquids  Notify SLP if continued concern for aspiration        SLP Plan: No skilled SLP  No Skilled SLP: No acute SLP goals identified  SLP - OK to Discharge: Yes        Goals:      N/a    Subjective     Baseline Assessment:  Respiratory Status: Room air  History of Intubation: No  Behavior/Cognition: Alert, Cooperative, Pleasant mood (A/O x 4)  Hearing: Within Functional Limits    History and Physical:    Per H&P:  \" 70 y.o. female PMHx NICM, IBS, anxiety presented to the ED for SOB, productive cough and chest pain for several days, found to be tachycardic, febrile, and midly hypoxic with CT showing multifocal mucous plugging of the lower lobes with associated patchy ground-glass and consolidative opacities in the lung bases concerning for CAP. Will continue CTX, azithromycin started in ED.      CT also notes focal peribronchial inflammation versus a 6 mm cavitary lesion in the right upper lobe. More likely to be s/t infectious process, however patient does have a hx of unexplained weight loss and follow-up imaging should likely be pursued once PNA has resolved. \"    No past medical history on file.  Family History   Family history unknown: Yes       Allergies   Allergen Reactions    Contact Metal Agent Hives     hives    Meperidine Nausea/vomiting    Mirabegron Hives and Other     Blood pressure rise  \"Contraindication-Medical Surgical\"    Latex Rash and Hives         Relevant Results  CT angio chest for pulmonary embolism " 03/07/2025    Narrative  Interpreted By:  Lauren Marin and Beyersdorf Conner  STUDY:  CT ANGIO CHEST FOR PULMONARY EMBOLISM;  3/7/2025 5:35 pm    INDICATION:  Signs/Symptoms:patient with SOB, chest pain, tachypnea.      COMPARISON:  None    ACCESSION NUMBER(S):  [id number]    ORDERING CLINICIAN:  TERESITA OVERTON    TECHNIQUE:  Helical data acquisition of the chest was obtained after intravenous  administration of 67 ML Omnipaque 350, as per PE protocol. Images  were reformatted in coronal and sagittal planes. Axial and coronal  maximum intensity projection (MIP) images were created and reviewed.    FINDINGS:  POTENTIAL LIMITATIONS OF THE STUDY: None    HEART AND VESSELS:  There are no discrete filling defects within main pulmonary artery  and its branches to suggest acute pulmonary embolism. Main pulmonary  artery and its branches are normal in caliber.    The thoracic aorta normal in course and caliber.No significant  atherosclerotic disease of the thoracic aorta.Although, the study is  not tailored for evaluation of aorta, there is no definite evidence  of acute aortic pathology. Mild coronary artery calcifications are  seen. Please note,the study is not optimized for evaluation of  coronary arteries.    Mild cardiomegaly.There are no findings to suggest right heart strain.  There is no pericardial effusion seen.    MEDIASTINUM AND KAILEE, LOWER NECK AND AXILLA:  The visualized thyroid gland is within normal limits.  No evidence of thoracic lymphadenopathy by CT criteria.  Small hiatal hernia.    LUNGS AND AIRWAYS:  The trachea and central airways are patent. No endobronchial lesion  is seen. Multifocal mucous plugging in the segmental airways of the  lower lobes. Focal bronchial wall thickening and inflammation versus  a small cavitary in the right upper lobe measuring up to 6 mm (series  402, image 118).    Patchy ground-glass and consolidative opacities in the lung bases.  Bibasilar atelectasis. No sizable  pneumothorax. No pleural effusion.    UPPER ABDOMEN:  The visualized subdiaphragmatic structures demonstrate no remarkable  findings.    CHEST WALL AND OSSEOUS STRUCTURES:  Chest wall is within normal limits.  No acute osseous pathology.There are no suspicious osseous lesions.    Impression  1. No evidence of acute pulmonary embolism to the segmental level.  2. Multifocal mucous plugging of the lower lobes with associated  patchy ground-glass and consolidative opacities in the lung bases.  Findings are favored to represent aspiration or pneumonia.  3. Focal peribronchial inflammation versus a 6 mm cavitary lesion in  the right upper lobe. Attention on follow-up suggested.  4. Small hiatal hernia.    I personally reviewed the image(s)/study and resident interpretation.  I agree with the findings as stated by resident Clovis Irving.  Data analyzed and images interpreted at [facility], [place], [place].    MACRO:  None    Signed by: Lauren Marin 3/7/2025 7:27 PM  Dictation workstation:   [id number]        Objective     Oral/Motor Assessment:  Dentition: Adequate/Natural  Oral Motor: Within Functional Limits  Hearing: Within Functional Limits      Clinical Observations:    The 3 oz sequential drinks of thin liquid water was utilized as a reliable, evidence based test to rule out silent aspiration and determine need for additional testing, such as the MBS or FEES (fiberoptic endoscopic evaluation of swallow), if the test is equivocal, incomplete or pt shows s/sx of aspiration,  prior to recommending a oral diet    Patient Positioning: Upright in Bed  Was The 3 oz Swallow Protocol Completed: Yes    Consistencies Trialed: Yes  Consistencies Trialed: Thin (IDDSI Level 0) - Straw, Thin (IDDSI Level 0) - Cup, Pureed/extremely thick (IDDSI Level 4), Regular (IDDSI Level 7)    Oral phase:  WFL    Pharyngeal Phase: suspect WFL

## 2025-03-08 NOTE — CARE PLAN
The patient's goals for the shift include      The clinical goals for the shift include Pt will remain safe and free from injury during this shift.    Over the shift, the patient did not make progress toward the following goals. Barriers to progression include . Recommendations to address these barriers include .

## 2025-03-08 NOTE — PROGRESS NOTES
"Pharmacy Medication History Review    Agnes Vasquez is a 70 y.o. female admitted for Community acquired bacterial pneumonia. Pharmacy reviewed the patient's [id number] medications and allergies for accuracy.    The list below reflects the updated PTA list.   Prior to Admission Medications   Prescriptions  Patient / Chart / Pharmacy Reported?   Opzelura 1.5 % cream  Yes   Sig: APPLY TO AFFECTED AREAS TWICE DAILY -   NOT TO EXCEED 2 GRAMS PER DAY.  --> Last Fill 2/13/25, #60g/30day   aspirin 81 mg EC tablet  Yes   Sig: Take 1 tablet (81 mg) by mouth once daily in the evening.   calcium carbonate-vitamin D3 500 mg-5 mcg (200 unit) tablet  Yes   Sig: Take 1 tablet by mouth once daily in the morning.   hydrocortisone 2.5 % ointment  Yes   Sig: Apply topically 2 times a day as needed for irritation or rash.  --> Last Fill 2/8/25, 20g / 20 day   meloxicam (Mobic) 7.5 mg tablet  Yes   Sig: TAKE 1 TABLET (7.5 MG) BY MOUTH ONCE DAILY   AS NEEDED FOR MODERATE PAIN (4 - 6).  --> Last Fill 3/7/25, #30 / 30  day   multivitamin capsule  Yes   Sig: Take 1 capsule by mouth once daily in the morning.              The list below reflects the updated allergy list. Please review each documented allergy for additional clarification and justification.  Allergies  Reviewed by Symone Dee RN on 3/7/2025        Severity Reactions Comments    Contact Metal Agent Not Specified Hives hives    Meperidine Not Specified Nausea/vomiting     Mirabegron Not Specified Hives, Other Blood pressure rise \"Contraindication-Medical Surgical\"    Latex Low Rash, Hives           Meds to Bed marked as accepted.  Reassess on discharge preferred if needed.  Local Pharmacy per Dispense Report:  [facility]/pharmacy #[id number] - [place], [place] -   [id number] AT [facility]   P: [phone]     Sources used to complete the med history include:  Patient Interview (awake/alert; good historian; able to recognize/confirm/clarify medications " using name prompting from sources below.)  Epic Dispense Report (Pharmacy Fill Activity)  Current [facility] Ambulatory/PTA Medication List  [facility],  Clinical Summary/Chart Review (Medications)  Chart Review; Recent/Past Encounters, Office Visits  OARRS (no recent activity found)    Below are additional concerns with the patient's PTA list:  None; Additionally, history of XIIDRA 5% EYE DROPS (LF 11/29/24) found in Dispense Report; pt states may use PRN.     ----------------------------------  Adithya Wagner, Sara, [facility]  Transitions of Care Pharmacist  Medication reconciliation complete  Please reach out via Epic Secure Chat (7p-7a) for questions,   or if no response call [phone].

## 2025-03-08 NOTE — PROGRESS NOTES
Agnes Vasquez is a 70 y.o. female on day 1 of admission presenting with Community acquired bacterial pneumonia.      Subjective   Seen this morning, no active complaints. No acute event overnight.   Patient shows understanding of her management plan.        Objective     Last Recorded Vitals  /61   Pulse 87   Temp (!) 39.4 °C (102.9 °F) (Oral)   Resp 15   Wt 59 kg (130 lb)   SpO2 95%   Intake/Output last 3 Shifts:  No intake or output data in the 24 hours ending 03/08/25 1140    Admission Weight  Weight: 59 kg (130 lb) (03/07/25 1102)    Daily Weight  03/07/25 : 59 kg (130 lb)           Results from last 72 hours   Lab Units 03/08/25  0444 03/07/25  1253   WBC AUTO x10*3/uL 7.7 6.8   HEMOGLOBIN g/dL 12.2 13.8   MCV fL 86 87   MCH pg 30.1 30.2   MCHC g/dL 35.2 34.8   RDW % 14.4 14.6*   PLATELETS AUTO x10*3/uL 117* 146*     Results from last 72 hours   Lab Units 03/08/25  0444 03/07/25  1253   GLUCOSE mg/dL 125* 101*   SODIUM mmol/L 137 139   POTASSIUM mmol/L 3.8 3.7   CHLORIDE mmol/L 103 103   CO2 mmol/L 25 24   CREATININE mg/dL 0.84 0.91   EGFR mL/min/1.73m*2 75 68   CALCIUM mg/dL 8.4* 9.1   ALBUMIN g/dL 3.8  --       @[id number](INR:7, PT:7, aPTT:7)@    Images:  CT angio chest for pulmonary embolism  3/7/2025  IMPRESSION:  1. No evidence of acute pulmonary embolism to the segmental level.  2. Multifocal mucous plugging of the lower lobes with associated  patchy ground-glass and consolidative opacities in the lung bases.  Findings are favored to represent aspiration or pneumonia.  3. Focal peribronchial inflammation versus a 6 mm cavitary lesion in  the right upper lobe. Attention on follow-up suggested.  4. Small hiatal hernia.      Assessment and plan:    70 y.o. female PMHx NICM, IBS, anxiety. Admitted as a case of PNA. The cavitry lesion will be worked up for TB, if negative, will do repeat CT chest after six week.      #SOB, CP, productive cough, fever  #CAP   :: CT PE w/ multifocal mucous  plugging of the lower lobes with associated  patchy ground-glass and consolidative opacities in the lung bases. Findings are favored to represent aspiration or pneumonia.  Focal peribronchial inflammation versus a 6 mm cavitary lesion in the right upper lobe.   -will send strep/legionella UA  -continue CTX and azithromycin  -duonebs q6 prn  -will schedule tylenol for 2 doses for fever       #Tachycardia  :: ECG ST, likely s/t dehydration and mild hypoxia  -patient declined IVF, prefers to increase PO intake     #Weight loss  :: endorses 20lbs in the last three months, which she believes it d/t malabsorption. CT A/P and cscope ordered by PCP. CT unremarkable. Has a GI appt end of March. Normal TSH last year.  :: unexplained weight loss in combination with possible cavitary lesion on CT warrants further evaluation with serial CTs to assess for progression (no previous chest imaging)  -needs follow-up CT once PNA has resolved  -f/up GI and PCP outpatient     DVT ppx: lovenox  Code: DNR/DNI (confirmed on admission)  NOK: Mary Ann Vasquez (Daughter)  [phone]    Talal Alumri PGY1

## 2025-03-08 NOTE — HOSPITAL COURSE
Ms. Agnes Vasquez, a 70-year-old female with a history of NICM, IBS, and anxiety, presented with several days of shortness of breath, productive cough, pleuritic chest pain, fever, and tachycardia. Imaging revealed multifocal mucous plugging, ground-glass opacities, and a cavitary lesion in the right upper lobe, raising concerns for community-acquired pneumonia  and a possible infectious process. She was started on ceftriaxone and azithromycin, with further workup including strep and legionella urinary antigens came back positive for streptococcus pneumonia antigen. Antibiotics we de-esclaee it levofloxacin. Her tachycardia was attributed to dehydration and mild hypoxia, for which she opted to increase oral intake rather than IV fluids. Notably, she has experienced a 20-pound unintentional weight loss over three months, with a prior unremarkable CT A/P and an upcoming GI evaluation. Given the cavitary lesion, a follow-up CT will be pursued after pneumonia resolution to assess for progression.         ( x ) follow Blood culture  ( x ) follow sputum culture  ( x ) Order T-Spot test on Monday.   (  x) arrange a follow up CT chest, 6 week after discahrge.   (  ) Discharge home.

## 2025-03-09 VITALS
SYSTOLIC BLOOD PRESSURE: 131 MMHG | WEIGHT: 130 LBS | DIASTOLIC BLOOD PRESSURE: 85 MMHG | TEMPERATURE: 98.4 F | HEIGHT: 64 IN | BODY MASS INDEX: 22.2 KG/M2 | HEART RATE: 103 BPM | OXYGEN SATURATION: 94 % | RESPIRATION RATE: 16 BRPM

## 2025-03-09 LAB
ALBUMIN SERPL BCP-MCNC: 3.8 G/DL (ref 3.4–5)
ANION GAP SERPL CALC-SCNC: 12 MMOL/L (ref 10–20)
BACTERIA BLD CULT: NORMAL
BACTERIA BLD CULT: NORMAL
BASOPHILS # BLD MANUAL: 0 X10*3/UL (ref 0–0.1)
BASOPHILS NFR BLD MANUAL: 0 %
BUN SERPL-MCNC: 10 MG/DL (ref 6–23)
BURR CELLS BLD QL SMEAR: ABNORMAL
C DIF TOX TCDA+TCDB STL QL NAA+PROBE: NOT DETECTED
CALCIUM SERPL-MCNC: 9 MG/DL (ref 8.6–10.6)
CHLORIDE SERPL-SCNC: 104 MMOL/L (ref 98–107)
CO2 SERPL-SCNC: 28 MMOL/L (ref 21–32)
CREAT SERPL-MCNC: 0.76 MG/DL (ref 0.5–1.05)
EGFRCR SERPLBLD CKD-EPI 2021: 84 ML/MIN/1.73M*2
EOSINOPHIL # BLD MANUAL: 0 X10*3/UL (ref 0–0.7)
EOSINOPHIL NFR BLD MANUAL: 0 %
ERYTHROCYTE [DISTWIDTH] IN BLOOD BY AUTOMATED COUNT: 14.9 % (ref 11.5–14.5)
GLUCOSE SERPL-MCNC: 83 MG/DL (ref 74–99)
HCT VFR BLD AUTO: 37.9 % (ref 36–46)
HGB BLD-MCNC: 12.3 G/DL (ref 12–16)
IMM GRANULOCYTES # BLD AUTO: 0.03 X10*3/UL (ref 0–0.7)
IMM GRANULOCYTES NFR BLD AUTO: 0.5 % (ref 0–0.9)
LYMPHOCYTES # BLD MANUAL: 1.17 X10*3/UL (ref 1.2–4.8)
LYMPHOCYTES NFR BLD MANUAL: 19.5 %
MAGNESIUM SERPL-MCNC: 2.37 MG/DL (ref 1.6–2.4)
MCH RBC QN AUTO: 29.5 PG (ref 26–34)
MCHC RBC AUTO-ENTMCNC: 32.5 G/DL (ref 32–36)
MCV RBC AUTO: 91 FL (ref 80–100)
METAMYELOCYTES # BLD MANUAL: 0.05 X10*3/UL
METAMYELOCYTES NFR BLD MANUAL: 0.8 %
MONOCYTES # BLD MANUAL: 0.41 X10*3/UL (ref 0.1–1)
MONOCYTES NFR BLD MANUAL: 6.8 %
NEUTROPHILS # BLD MANUAL: 3.66 X10*3/UL (ref 1.2–7.7)
NEUTS BAND # BLD MANUAL: 0.41 X10*3/UL (ref 0–0.7)
NEUTS BAND NFR BLD MANUAL: 6.8 %
NEUTS SEG # BLD MANUAL: 3.25 X10*3/UL (ref 1.2–7)
NEUTS SEG NFR BLD MANUAL: 54.2 %
NRBC BLD-RTO: 0 /100 WBCS (ref 0–0)
PHOSPHATE SERPL-MCNC: 3 MG/DL (ref 2.5–4.9)
PLASMA CELLS # BLD MANUAL: 0.1 X10*3/UL
PLASMA CELLS NFR BLD MANUAL: 1.7 %
PLATELET # BLD AUTO: 154 X10*3/UL (ref 150–450)
POTASSIUM SERPL-SCNC: 4 MMOL/L (ref 3.5–5.3)
RBC # BLD AUTO: 4.17 X10*6/UL (ref 4–5.2)
RBC MORPH BLD: ABNORMAL
SODIUM SERPL-SCNC: 140 MMOL/L (ref 136–145)
TOTAL CELLS COUNTED BLD: 118
VARIANT LYMPHS # BLD MANUAL: 0.61 X10*3/UL (ref 0–0.5)
VARIANT LYMPHS NFR BLD: 10.2 %
WBC # BLD AUTO: 6 X10*3/UL (ref 4.4–11.3)

## 2025-03-09 PROCEDURE — 36415 COLL VENOUS BLD VENIPUNCTURE: CPT

## 2025-03-09 PROCEDURE — 99233 SBSQ HOSP IP/OBS HIGH 50: CPT | Performed by: INTERNAL MEDICINE

## 2025-03-09 PROCEDURE — 83735 ASSAY OF MAGNESIUM: CPT

## 2025-03-09 PROCEDURE — RXMED WILLOW AMBULATORY MEDICATION CHARGE

## 2025-03-09 PROCEDURE — 2500000001 HC RX 250 WO HCPCS SELF ADMINISTERED DRUGS (ALT 637 FOR MEDICARE OP)

## 2025-03-09 PROCEDURE — 94640 AIRWAY INHALATION TREATMENT: CPT

## 2025-03-09 PROCEDURE — 80069 RENAL FUNCTION PANEL: CPT

## 2025-03-09 PROCEDURE — 2500000002 HC RX 250 W HCPCS SELF ADMINISTERED DRUGS (ALT 637 FOR MEDICARE OP, ALT 636 FOR OP/ED)

## 2025-03-09 PROCEDURE — 85007 BL SMEAR W/DIFF WBC COUNT: CPT

## 2025-03-09 PROCEDURE — 85027 COMPLETE CBC AUTOMATED: CPT

## 2025-03-09 PROCEDURE — 1210000001 HC SEMI-PRIVATE ROOM DAILY

## 2025-03-09 RX ORDER — IPRATROPIUM BROMIDE AND ALBUTEROL SULFATE 2.5; .5 MG/3ML; MG/3ML
3 SOLUTION RESPIRATORY (INHALATION) EVERY 4 HOURS PRN
Status: ACTIVE | OUTPATIENT
Start: 2025-03-09

## 2025-03-09 RX ORDER — LEVOFLOXACIN 750 MG/1
750 TABLET ORAL DAILY
Qty: 5 TABLET | Refills: 0 | Status: SHIPPED | OUTPATIENT
Start: 2025-03-09 | End: 2025-03-14

## 2025-03-09 RX ORDER — POTASSIUM CHLORIDE 1.5 G/1.58G
20 POWDER, FOR SOLUTION ORAL ONCE
Status: DISCONTINUED | OUTPATIENT
Start: 2025-03-09 | End: 2025-03-09

## 2025-03-09 RX ORDER — LOPERAMIDE HYDROCHLORIDE 2 MG/1
4 CAPSULE ORAL 4 TIMES DAILY PRN
Status: DISPENSED | OUTPATIENT
Start: 2025-03-09

## 2025-03-09 RX ORDER — ALBUTEROL SULFATE 90 UG/1
2 INHALANT RESPIRATORY (INHALATION) EVERY 4 HOURS PRN
Qty: 8.5 G | Refills: 5 | Status: SHIPPED | OUTPATIENT
Start: 2025-03-09

## 2025-03-09 RX ORDER — LEVOFLOXACIN 750 MG/1
750 TABLET ORAL EVERY 24 HOURS
Status: DISPENSED | OUTPATIENT
Start: 2025-03-09 | End: 2025-03-14

## 2025-03-09 RX ORDER — BUDESONIDE AND FORMOTEROL FUMARATE DIHYDRATE 160; 4.5 UG/1; UG/1
2 AEROSOL RESPIRATORY (INHALATION)
Qty: 30.6 G | Refills: 3 | Status: SHIPPED | OUTPATIENT
Start: 2025-03-09

## 2025-03-09 RX ORDER — POTASSIUM CHLORIDE 1.5 G/1.58G
40 POWDER, FOR SOLUTION ORAL ONCE
Status: COMPLETED | OUTPATIENT
Start: 2025-03-09 | End: 2025-03-09

## 2025-03-09 RX ADMIN — IPRATROPIUM BROMIDE AND ALBUTEROL SULFATE 3 ML: .5; 3 SOLUTION RESPIRATORY (INHALATION) at 04:05

## 2025-03-09 RX ADMIN — POTASSIUM CHLORIDE 40 MEQ: 1.5 POWDER, FOR SOLUTION ORAL at 09:11

## 2025-03-09 RX ADMIN — MELOXICAM 7.5 MG: 7.5 TABLET ORAL at 09:11

## 2025-03-09 RX ADMIN — LEVOFLOXACIN 750 MG: 750 TABLET, FILM COATED ORAL at 12:56

## 2025-03-09 RX ADMIN — LOPERAMIDE HYDROCHLORIDE 4 MG: 2 CAPSULE ORAL at 18:35

## 2025-03-09 RX ADMIN — Medication 1 TABLET: at 09:11

## 2025-03-09 RX ADMIN — LOPERAMIDE HYDROCHLORIDE 4 MG: 2 CAPSULE ORAL at 09:15

## 2025-03-09 RX ADMIN — ASPIRIN 81 MG: 81 TABLET, COATED ORAL at 09:11

## 2025-03-09 ASSESSMENT — PAIN SCALES - GENERAL
PAINLEVEL_OUTOF10: 3
PAINLEVEL_OUTOF10: 0 - NO PAIN

## 2025-03-09 ASSESSMENT — PAIN - FUNCTIONAL ASSESSMENT: PAIN_FUNCTIONAL_ASSESSMENT: 0-10

## 2025-03-09 NOTE — CARE PLAN
The patient's goals for the shift include      The clinical goals for the shift include Pt will remain safe and free from injury during this shift.    Over the shift, the patient did not make progress toward the following goals. Barriers to progression include   Problem: Fall/Injury  Goal: Verbalize understanding of risk factor reduction measures to prevent injury from fall in the home  Outcome: Progressing  Goal: Use assistive devices by end of the shift  Outcome: Progressing  Goal: Pace activities to prevent fatigue by end of the shift  Outcome: Progressing     Problem: Fall/Injury  Goal: Use assistive devices by end of the shift  Outcome: Progressing

## 2025-03-09 NOTE — CARE PLAN
The patient's goals for the shift include      The clinical goals for the shift include Pt will remain safe and free from injury during this shift    Over the shift, the patient did not make progress toward the following goals. Barriers to progression include   Problem: Fall/Injury  Goal: Not fall by end of shift  Outcome: Progressing  Goal: Be free from injury by end of the shift  Outcome: Progressing

## 2025-03-09 NOTE — PROGRESS NOTES
Agnes Vasquez is a 70 y.o. female on day 2 of admission presenting with Community acquired bacterial pneumonia.      Subjective   Seen this morning, no active complaints. No acute event overnight.   She feel she is not ready to be discharged yet. Walking pulse ox is 92.        Objective     Last Recorded Vitals  /69   Pulse 87   Temp 36.8 °C (98.2 °F)   Resp 18   Wt 59 kg (130 lb)   SpO2 93%   Intake/Output last 3 Shifts:    Intake/Output Summary (Last 24 hours) at 3/9/2025 1103  Last data filed at 3/8/2025 2200  Gross per 24 hour   Intake 200 ml   Output 400 ml   Net -200 ml       Admission Weight  Weight: 59 kg (130 lb) (03/07/25 1102)    Daily Weight  03/07/25 : 59 kg (130 lb)           Results from last 72 hours   Lab Units 03/09/25  0646 03/08/25  1317 03/08/25  0444 03/07/25  1253   WBC AUTO x10*3/uL 6.0 8.6 7.7 6.8   HEMOGLOBIN g/dL 12.3 12.2 12.2 13.8   MCV fL 91 86 86 87   MCH pg 29.5 29.5 30.1 30.2   MCHC g/dL 32.5 34.3 35.2 34.8   RDW % 14.9* 14.5 14.4 14.6*   PLATELETS AUTO x10*3/uL 154 126* 117* 146*     Results from last 72 hours   Lab Units 03/09/25  0646 03/08/25  1317 03/08/25  0444 03/07/25  1253   GLUCOSE mg/dL 83 149* 125* 101*   SODIUM mmol/L 140 137 137 139   POTASSIUM mmol/L 4.0 3.3* 3.8 3.7   CHLORIDE mmol/L 104 101 103 103   CO2 mmol/L 28 26 25 24   CREATININE mg/dL 0.76 0.84 0.84 0.91   EGFR mL/min/1.73m*2 84 75 75 68   CALCIUM mg/dL 9.0 9.0 8.4* 9.1   ALBUMIN g/dL 3.8 4.0 3.8  --       @[id number](INR:7, PT:7, aPTT:7)@    Images:  CT angio chest for pulmonary embolism  3/7/2025  IMPRESSION:  1. No evidence of acute pulmonary embolism to the segmental level.  2. Multifocal mucous plugging of the lower lobes with associated  patchy ground-glass and consolidative opacities in the lung bases.  Findings are favored to represent aspiration or pneumonia.  3. Focal peribronchial inflammation versus a 6 mm cavitary lesion in  the right upper lobe. Attention on follow-up  suggested.  4. Small hiatal hernia.      Assessment and plan:    70 y.o. female PMHx NICM, IBS, anxiety. Admitted as a case of PNA. The cavitry lesion will be worked up for TB, if negative, will do repeat CT chest after six week.       Update:  1- Discontinue Ceftriaxone and azithromycin and start her on levofloxacin  2- positive streptococcus pneumonia  .    #SOB, CP, productive cough, fever  #CAP   :: CT PE w/ multifocal mucous plugging of the lower lobes with associated  patchy ground-glass and consolidative opacities in the lung bases. Findings are favored to represent aspiration or pneumonia.  Focal peribronchial inflammation versus a 6 mm cavitary lesion in the right upper lobe.   -will send strep/legionella UA  -continue CTX and azithromycin  -duonebs q6 prn  -will schedule tylenol for 2 doses for fever       #Tachycardia  :: ECG ST, likely s/t dehydration and mild hypoxia  -patient declined IVF, prefers to increase PO intake     #Weight loss  :: endorses 20lbs in the last three months, which she believes it d/t malabsorption. CT A/P and cscope ordered by PCP. CT unremarkable. Has a GI appt end of March. Normal TSH last year.  :: unexplained weight loss in combination with possible cavitary lesion on CT warrants further evaluation with serial CTs to assess for progression (no previous chest imaging)  -needs follow-up CT once PNA has resolved  -f/up GI and PCP outpatient     DVT ppx: lovenox  Code: DNR/DNI (confirmed on admission)  NOK: Mary Ann Vasquez (Daughter)  [phone]    Talal Alumri PGY1

## 2025-03-10 ENCOUNTER — PHARMACY VISIT (OUTPATIENT)
Dept: PHARMACY | Facility: CLINIC | Age: 70
End: 2025-03-10
Payer: MEDICARE

## 2025-03-10 VITALS
SYSTOLIC BLOOD PRESSURE: 107 MMHG | TEMPERATURE: 97.3 F | HEART RATE: 69 BPM | WEIGHT: 130 LBS | DIASTOLIC BLOOD PRESSURE: 69 MMHG | OXYGEN SATURATION: 95 % | HEIGHT: 64 IN | BODY MASS INDEX: 22.2 KG/M2 | RESPIRATION RATE: 16 BRPM

## 2025-03-10 LAB
ALBUMIN SERPL BCP-MCNC: 3.8 G/DL (ref 3.4–5)
ANION GAP SERPL CALC-SCNC: 12 MMOL/L (ref 10–20)
BASOPHILS # BLD MANUAL: 0 X10*3/UL (ref 0–0.1)
BASOPHILS NFR BLD MANUAL: 0 %
BUN SERPL-MCNC: 12 MG/DL (ref 6–23)
BURR CELLS BLD QL SMEAR: ABNORMAL
CALCIUM SERPL-MCNC: 9.2 MG/DL (ref 8.6–10.6)
CHLORIDE SERPL-SCNC: 108 MMOL/L (ref 98–107)
CO2 SERPL-SCNC: 24 MMOL/L (ref 21–32)
CREAT SERPL-MCNC: 0.84 MG/DL (ref 0.5–1.05)
EGFRCR SERPLBLD CKD-EPI 2021: 75 ML/MIN/1.73M*2
EOSINOPHIL # BLD MANUAL: 0.07 X10*3/UL (ref 0–0.7)
EOSINOPHIL NFR BLD MANUAL: 1.7 %
ERYTHROCYTE [DISTWIDTH] IN BLOOD BY AUTOMATED COUNT: 14.8 % (ref 11.5–14.5)
GLUCOSE SERPL-MCNC: 87 MG/DL (ref 74–99)
HCT VFR BLD AUTO: 37.3 % (ref 36–46)
HGB BLD-MCNC: 11.9 G/DL (ref 12–16)
IMM GRANULOCYTES # BLD AUTO: 0.1 X10*3/UL (ref 0–0.7)
IMM GRANULOCYTES NFR BLD AUTO: 2.4 % (ref 0–0.9)
LYMPHOCYTES # BLD MANUAL: 1.57 X10*3/UL (ref 1.2–4.8)
LYMPHOCYTES NFR BLD MANUAL: 38.3 %
MAGNESIUM SERPL-MCNC: 2.08 MG/DL (ref 1.6–2.4)
MCH RBC QN AUTO: 29.1 PG (ref 26–34)
MCHC RBC AUTO-ENTMCNC: 31.9 G/DL (ref 32–36)
MCV RBC AUTO: 91 FL (ref 80–100)
MONOCYTES # BLD MANUAL: 0.07 X10*3/UL (ref 0.1–1)
MONOCYTES NFR BLD MANUAL: 1.7 %
NEUTS SEG # BLD MANUAL: 2.21 X10*3/UL (ref 1.2–7)
NEUTS SEG NFR BLD MANUAL: 53.9 %
NRBC BLD-RTO: 0 /100 WBCS (ref 0–0)
OVALOCYTES BLD QL SMEAR: ABNORMAL
PHOSPHATE SERPL-MCNC: 3.9 MG/DL (ref 2.5–4.9)
PLATELET # BLD AUTO: 180 X10*3/UL (ref 150–450)
POLYCHROMASIA BLD QL SMEAR: ABNORMAL
POTASSIUM SERPL-SCNC: 4.4 MMOL/L (ref 3.5–5.3)
RBC # BLD AUTO: 4.09 X10*6/UL (ref 4–5.2)
RBC MORPH BLD: ABNORMAL
SODIUM SERPL-SCNC: 140 MMOL/L (ref 136–145)
TOTAL CELLS COUNTED BLD: 115
VARIANT LYMPHS # BLD MANUAL: 0.18 X10*3/UL (ref 0–0.5)
VARIANT LYMPHS NFR BLD: 4.4 %
WBC # BLD AUTO: 4.1 X10*3/UL (ref 4.4–11.3)

## 2025-03-10 PROCEDURE — 2500000001 HC RX 250 WO HCPCS SELF ADMINISTERED DRUGS (ALT 637 FOR MEDICARE OP)

## 2025-03-10 PROCEDURE — 85027 COMPLETE CBC AUTOMATED: CPT

## 2025-03-10 PROCEDURE — 99238 HOSP IP/OBS DSCHRG MGMT 30/<: CPT

## 2025-03-10 PROCEDURE — 85007 BL SMEAR W/DIFF WBC COUNT: CPT

## 2025-03-10 PROCEDURE — 83735 ASSAY OF MAGNESIUM: CPT

## 2025-03-10 PROCEDURE — 80069 RENAL FUNCTION PANEL: CPT

## 2025-03-10 PROCEDURE — 36415 COLL VENOUS BLD VENIPUNCTURE: CPT

## 2025-03-10 RX ADMIN — LEVOFLOXACIN 750 MG: 750 TABLET, FILM COATED ORAL at 10:58

## 2025-03-10 RX ADMIN — MELOXICAM 7.5 MG: 7.5 TABLET ORAL at 08:40

## 2025-03-10 RX ADMIN — ASPIRIN 81 MG: 81 TABLET, COATED ORAL at 08:40

## 2025-03-10 RX ADMIN — Medication 1 TABLET: at 08:40

## 2025-03-10 ASSESSMENT — PAIN SCALES - GENERAL
PAINLEVEL_OUTOF10: 0 - NO PAIN
PAINLEVEL_OUTOF10: 0 - NO PAIN

## 2025-03-10 ASSESSMENT — COGNITIVE AND FUNCTIONAL STATUS - GENERAL
DAILY ACTIVITIY SCORE: 24
MOBILITY SCORE: 24
DAILY ACTIVITIY SCORE: 24
MOBILITY SCORE: 24

## 2025-03-10 ASSESSMENT — ACTIVITIES OF DAILY LIVING (ADL): LACK_OF_TRANSPORTATION: NO

## 2025-03-10 NOTE — DISCHARGE SUMMARY
Discharge Diagnosis  Community acquired bacterial pneumonia    Issues Requiring Follow-Up  - Outpatient follow-up with PCP Dr. Lagunas  - Continue Levofloxacin until 3/15 to complete treatment course for community acquired pneumonia  - CT chest in 6 weeks following discharge for interval re-assessment of right upper lobe nodule  - Outpatient PFTs for evaluation of suspected COPD    Discharge Meds     Medication List      START taking these medications     albuterol 90 mcg/actuation inhaler; Commonly known as: Ventolin HFA;   Inhale 2 puffs every 4 hours if needed for wheezing or shortness of   breath.   budesonide-formoteroL 160-4.5 mcg/actuation inhaler; Commonly known as:   Symbicort; Inhale 2 puffs 2 times a day. Rinse mouth with water after use   to reduce aftertaste and incidence of candidiasis. Do not swallow.   levoFLOXacin 750 mg tablet; Commonly known as: Levaquin; Take 1 tablet   (750 mg) by mouth once daily for 5 days.     CONTINUE taking these medications     aspirin 81 mg EC tablet   calcium carbonate-vitamin D3 500 mg-5 mcg (200 unit) tablet   hydrocortisone 2.5 % ointment; Apply topically 2 times a day as needed   for irritation or rash.   meloxicam 7.5 mg tablet; Commonly known as: Mobic; TAKE 1 TABLET (7.5   MG) BY MOUTH ONCE DAILY AS NEEDED FOR MODERATE PAIN (4 - 6).   multivitamin capsule   Opzelura 1.5 % cream; Generic drug: ruxolitinib; APPLY TO AFFECTED AREAS   TWICE DAILY - NOT TO EXCEED 2 GRAMS PER DAY.       Test Results Pending At Discharge  Pending Labs       Order Current Status    Blood Culture Preliminary result    Blood Culture Preliminary result            Hospital Course  Ms. Agnes Vasquez, a 70-year-old female with a history of NICM, IBS, and anxiety, presented with several days of shortness of breath, productive cough, pleuritic chest pain, fever, and tachycardia. Imaging revealed multifocal mucous plugging, ground-glass opacities, and a cavitary lesion in the right upper lobe,  raising concerns for community-acquired pneumonia  and a possible infectious process. She was started on ceftriaxone and azithromycin, with further workup including strep and legionella urinary antigens came back positive for streptococcus pneumonia antigen. Antibiotics were de-escalated to levofloxacin. Her tachycardia was attributed to dehydration and mild hypoxia, for which she opted to increase oral intake rather than IV fluids. Notably, she has experienced a 20-pound unintentional weight loss over three months, with a prior unremarkable CT A/P and an upcoming GI outpatient follow-up. Given the cavitary lesion, a follow-up CT will be pursued after pneumonia resolution to assess for progression. Otherwise her symptoms significantly improved and she was discharged home on 3/10/25.    Pertinent Physical Exam At Time of Discharge  Constitutional: Well-appearing, no acute distress  HEENT: Normocephalic, atraumatic  Cardiovascular: Regular rate and rhythm, no murmurs/rubs/gallops  Pulmonary: Clear to auscultation bilaterally, no wheezes/crackles/rales. Nonlabored work of breathing. On room air  Abdominal: Soft, nontender, nondistended, no rebound/guarding  Extremities: No peripheral edema. Warm to touch  Neuro: No focal deficits. Aox4  Psych: Appropriate mood and affect.      Outpatient Follow-Up  Future Appointments   Date Time Provider Department Center   3/31/2025 10:40 AM Aniya Baeza PA-C [id number]         Abdullahi Chadwick MD

## 2025-03-10 NOTE — PROGRESS NOTES
03/10/25 1157   Discharge Planning   Living Arrangements Alone   Support Systems Children   Assistance Needed independent with ADL's   Type of Residence Private residence   Home or Post Acute Services None   Expected Discharge Disposition Home   Does the patient need discharge transport arranged? Yes   RoundTrip coordination needed? Yes   What day is the transport expected? 03/10/25   Housing Stability   In the last 12 months, was there a time when you were not able to pay the mortgage or rent on time? N   In the past 12 months, how many times have you moved where you were living? 0   At any time in the past 12 months, were you homeless or living in a shelter (including now)? N   Transportation Needs   In the past 12 months, has lack of transportation kept you from medical appointments or from getting medications? no   In the past 12 months, has lack of transportation kept you from meetings, work, or from getting things needed for daily living? No       Transitional Care Coordination Progress Note:  Patient discussed during interdisciplinary rounds.   Team members present: MD and TCC  Plan per Medical/Surgical team: Patient will discharge to home today. Will discharge via Uber/Lyft. New prescription medications were delivered to the bedside. Patient is requesting housing resources, will consult the .  Payor: [employer]  Discharge disposition: home  Potential Barriers: none  ADOD:  3/10/25    Renetta Wolfe RN, BSN  Transitional Care Coordinator

## 2025-03-10 NOTE — NURSING NOTE
Patient discharged to home today. She verbalized an understanding of the AVS instructions after review with this nurse. Her IV was removed with tip intact. Her belongings were packed. Meds to bed prescriptions were delivered from [facility] pharmacy. She was transported home by [employer] transportation.

## 2025-03-10 NOTE — CARE PLAN
The clinical goals for the shift include Patient will remain safe and stable throughout the shift

## 2025-03-10 NOTE — CARE PLAN
The patient's goals for the shift include      The clinical goals for the shift include Pt will remain safe and free from injury during this shift    Problem: Fall/Injury  Goal: Not fall by end of shift  Outcome: Progressing  Goal: Be free from injury by end of the shift  Outcome: Progressing  Goal: Verbalize understanding of personal risk factors for fall in the hospital  Outcome: Progressing  Goal: Verbalize understanding of risk factor reduction measures to prevent injury from fall in the home  Outcome: Progressing  Goal: Use assistive devices by end of the shift  Outcome: Progressing

## 2025-03-12 ENCOUNTER — OFFICE VISIT (OUTPATIENT)
Dept: PRIMARY CARE | Facility: CLINIC | Age: 70
End: 2025-03-12
Payer: MEDICARE

## 2025-03-12 VITALS
SYSTOLIC BLOOD PRESSURE: 124 MMHG | HEART RATE: 80 BPM | RESPIRATION RATE: 12 BRPM | DIASTOLIC BLOOD PRESSURE: 75 MMHG | OXYGEN SATURATION: 98 %

## 2025-03-12 DIAGNOSIS — R19.7 DIARRHEA, UNSPECIFIED TYPE: ICD-10-CM

## 2025-03-12 DIAGNOSIS — J15.9 COMMUNITY ACQUIRED BACTERIAL PNEUMONIA: Primary | ICD-10-CM

## 2025-03-12 LAB
BACTERIA BLD CULT: NORMAL
BACTERIA BLD CULT: NORMAL

## 2025-03-12 PROCEDURE — 1157F ADVNC CARE PLAN IN RCRD: CPT | Performed by: INTERNAL MEDICINE

## 2025-03-12 PROCEDURE — 99213 OFFICE O/P EST LOW 20 MIN: CPT | Performed by: INTERNAL MEDICINE

## 2025-03-12 PROCEDURE — 1111F DSCHRG MED/CURRENT MED MERGE: CPT | Performed by: INTERNAL MEDICINE

## 2025-03-12 PROCEDURE — G2211 COMPLEX E/M VISIT ADD ON: HCPCS | Performed by: INTERNAL MEDICINE

## 2025-03-12 NOTE — CARE PLAN
3/11  CHW reached out to patient via housing resources, CHW will follow up  3/12  CHW reached out to patient via housing resources, CHW will follow up

## 2025-03-12 NOTE — PROGRESS NOTES
Subjective   Patient ID: Agnes Vasquez is a 70 y.o. female who presents for No chief complaint on file..    HPI follow-up visit no chest pain no shortness of breath has been coughing less since being on the Levaquin her blood tests and imaging were reviewed she had not scheduled yet her CT scan or pulmonary function testing    Review of Systems    Objective   There were no vitals taken for this visit.    Physical Exam vital signs noted alert and oriented x 3 NCAT no JVD no thrush chest clear to auscultation CV regular rate and rhythm S1-S2 extremities no clubbing cyanosis or edema normal distal pulses abdomen soft nontender normal active bowel sounds    Assessment/Plan    impression pneumonia diarrhea other diagnoses  Plan she has had some issues with her bowels in the past but may take a probiotic or a yogurt 1 to 2 hours after her Levaquin dosing in the morning for the duration of her treatment until next week scheduled for PFTs scheduled for CT scan referrals previously made from the hospital instructed in albuterol use instructed in Symbicort use and rinsing good diet breathing exercises with incentive spirometry continue with other medications and recheck if no better along with good hydration

## 2025-03-14 ENCOUNTER — PATIENT OUTREACH (OUTPATIENT)
Dept: CARE COORDINATION | Facility: CLINIC | Age: 70
End: 2025-03-14
Payer: MEDICARE

## 2025-03-14 SDOH — ECONOMIC STABILITY: FOOD INSECURITY
ARE ANY OF YOUR NEEDS URGENT? FOR EXAMPLE, UNCERTAINTY OF WHERE YOU WILL GET YOUR NEXT MEAL OR NOT HAVING THE MEDICATIONS YOU NEED TO TAKE TOMORROW.: NO

## 2025-03-14 SDOH — ECONOMIC STABILITY: GENERAL: WOULD YOU LIKE HELP WITH ANY OF THE FOLLOWING NEEDS?: I DONT NEED HELP WITH ANY OF THESE

## 2025-03-14 NOTE — SIGNIFICANT EVENT
03/14/25 1609   Social Determinants of Health- Help Requested   Would you like help with any of the following needs? I dont need help with any of these   Are any of your needs urgent? For example, uncertainty of where you will get your next meal or not having the medications you need to take tomorrow. N

## 2025-03-14 NOTE — PROGRESS NOTES
Inpatient Facility:  [facility]  Admission: 3/7/2025  Discharge: 3/10/2025  Discharge Diagnosis:  Pneumonia    Outreach completed.  This CM spoke with patient who states she is doing better continues to have some pain on left side of rib cage, patient encouraged to use heating pad if she doesn't want to take any pain medication, patient verbally agreed.  Patient has see her PCP on 3/12 for Hospital follow up, medications reviewed and plan of care, patient states she understands the plan.  Patient had no questions or concerns at this time.    Wrap Up  Wrap Up Additional Comments: CM will continue to follow (3/14/2025  4:08 PM)  Call End Time: 1555 (3/14/2025  4:08 PM)    Engagement  Call Start Time: 1550 (3/14/2025  4:08 PM)    Medications  Medications reviewed with patient/caregiver?: Yes (3/14/2025  4:08 PM)  Is the patient having any side effects they believe may be caused by any medication additions or changes?: No (3/14/2025  4:08 PM)  Does the patient have all medications ordered at discharge?: Yes (3/14/2025  4:08 PM)  Care Management Interventions: No intervention needed (3/14/2025  4:08 PM)  Is the patient taking all medications as directed (includes completed medication regime)?: Yes (3/14/2025  4:08 PM)    Appointments  Does the patient have a primary care provider?: Yes (3/14/2025  4:08 PM)  Care Management Interventions: Verified appointment date/time/provider (3/14/2025  4:08 PM)  Has the patient kept scheduled appointments due by today?: Yes (3/14/2025  4:08 PM)    Self Management  What is the home health agency?: None (3/14/2025  4:08 PM)  What Durable Medical Equipment (DME) was ordered?: None (3/14/2025  4:08 PM)    Patient Teaching  Does the patient have access to their discharge instructions?: Yes (3/14/2025  4:08 PM)  Care Management Interventions: Reviewed instructions with patient (3/14/2025  4:08 PM)  What is the patient's perception of their health status since discharge?: Improving (3/14/2025   4:08 PM)  Is the patient/caregiver able to teach back the hierarchy of who to call/visit for symptoms/problems? PCP, Specialist, Home Health nurse, Urgent Care, ED, 911: Yes (3/14/2025  4:08 PM)      SHYLA ChavezN, RN   [phone]   [facility]O Department

## 2025-03-17 ENCOUNTER — APPOINTMENT (OUTPATIENT)
Dept: PRIMARY CARE | Facility: CLINIC | Age: 70
End: 2025-03-17
Payer: MEDICARE

## 2025-03-20 NOTE — DOCUMENTATION CLARIFICATION NOTE
"    PATIENT:               GABY RAHMAN  ACCT #:                  [id number]  MRN:                       [id number]  :                       1955  ADMIT DATE:       3/7/2025 12:12 PM  DISCH DATE:        3/10/2025 12:49 PM  RESPONDING PROVIDER #:        [id number]          PROVIDER RESPONSE TEXT:    Patient treated for Streptococcus Pneumonia without Sepsis    CDI QUERY TEXT:    Clarification    Instruction:  Based on your assessment of the patient and the clinical information, please provide the requested documentation by clicking on the appropriate radio button and enter any additional information if prompted.    Question: Is there a diagnosis indicative of a patient meeting SIRS criteria and with organ dysfunction in the setting of Streptococcus Pneumonia    When answering this query, please exercise your independent professional judgment. The fact that a question is being asked, does not imply that any particular answer is desired or expected.    The patient's clinical indicators include:  Clinical Information: 70 YOF presented with several days of shortness of breath, productive cough, pleuritic chest pain, fever, and tachycardia.    3/10 Discharge Summary: \"Imaging revealed multifocal mucous plugging, ground-glass opacities, and a cavitary lesion in the right upper lobe, raising concerns for community-acquired pneumonia  and a possible infectious process. She was started on ceftriaxone and azithromycin, with further workup including strep and legionella urinary antigens came back positive for streptococcus pneumonia antigen.\"    Clinical Indicators: Platelets: 3/7: 146, 3/8: 117, 126, 3/9: 154, 180    3/7 VS at 1102: /98, T 36.6, , RR 20, SPO2 93%, 1444: T 39.4, 1947: , SPO2 91% on RA    Treatment: Started on Ceftriaxone and Azithromycin, Antibiotics were de-escalated to levofloxacin    Risk Factors: Streptococcus Pneumonia, decreased platelets  Options provided:  -- Sepsis with " hematological organ dysfunction of Thrombocytopenia  -- Sepsis with other organ dysfunction, Please specify sepsis associated organ dysfunction below  -- Patient treated for Streptococcus Pneumonia without Sepsis  -- Other - I will add my own diagnosis  -- Refer to Clinical Documentation Reviewer    Query created by: Liliana Guillory on 3/19/2025 12:26 PM      Electronically signed by:  NELI GARRETT MD 3/20/2025 7:05 AM

## 2025-03-24 ENCOUNTER — TELEPHONE (OUTPATIENT)
Dept: PRIMARY CARE | Facility: CLINIC | Age: 70
End: 2025-03-24

## 2025-03-30 NOTE — PROGRESS NOTES
History Of Present Illness  Agnes Vasquez is a 70 y.o. female with h/o NICM, anxiety,  IBS and recent pneumonia (March 2025 - Just finished Levaquin on 3/15/25) is here for postprandial epigastric pain, with bowel movement urgency and unintentional weight loss.     Pt recently dxd with pneumonia and  was taking abx.  She was c/o diarrhea so  a c.diff pcr was checked on 3/8/25 and it was negative.     Recent imaging include:  CT chest (3/7/25) done to assess for PE secondary to CP -   No evid of PE. Multifocal mucous plugging of lower lobes with patchy ground glass and consolidative opacities favoring aspiration or pneumonia. 6mm cavitary lesion in right upper lobe. Small  HH.   Repeat CT chest to be done in 5/5/25.    CT abd/p without IV contrast  (12/5/24) done secondary to c/o weight loss  - No acute findings. Small 1cm hernia in mid abdomen, no incarceration. Diverticulosis in right colon.     Previous GI workup:  Has been seen by Dr. Gaby Méndez at [facility]. Last visit was on June 2022.  The visit was a consultation prior to proceeding with a screening colonoscopy.    Colonoscopy was done on 6/30/22. Pt was asymptomatic at that time. Results showed diverticulosis, non bleeding internal hemorrhoids. No specimens collected. It was recommended she repeat surveillance in 5 yrs (2027).     Had EGD back in 12/2015 secondary to dyspepsia and weight loss. It showed 2 cm HH, gastritis (bx normal, no alteration) , normal duodenum (bx normal, no alteration)  A Colonoscopy was also done on same day. It showed diverticulosis and internal hemorrhoids. No specimens collected.      First screening colonoscopy was on 7/2013 and there were no polyps found in that exam. Just diverticulosis and hemorrhoids.     In the past, GI at [facility] did check CBC,  TSH and celiac panel secondary to her concerns of lower abdominal pain, bloating, loose stools and weight loss. All labs were normal except for mild  Leukopenia. Stool studies (cultures,  "c.diff, ova/parasite and fecal fat) were ordered and normal as well.  Her last CMP from 2023 also showed normal albumin and total protein.  She was then started on Benefiber and prescribed Bentyl. After reviewing some notes, these meds did help with her sx.      Weight on Dec 2024 - 129 lbs                     Sept 2023 - 139 lbs  Today she weighs    -    128 lbs.    At todays visit,pt c/o some epigastric pain, that can occur in random moments and sometimes after a meal, and she denies any n/v or acid reflux.  She also then has an urge to have a BM after eating (within 30 minutes), but it is formed and there is no diarrhea or blood. Pt is concerned she may not be absorbing her nutrients because she has a Bm right after eating and it seems to \"right through her\".   She is also concerned of her unintentional weight loss.      Past Medical History  NCIM, anxiety, IBS    Surgical History  She has a past surgical history that includes Breast biopsy (Left).  C section x 1  Bunionectomy x 2     Social History  She reports that she has quit smoking. Her smoking use included cigarettes. She has never used smokeless tobacco. She reports current alcohol use. She reports that she does not use drugs.  Occasional ETOH - 1-2 drinks/month    Family History  Family History   Family history unknown: Yes     No GI cancers  Allergies  Contact metal agent, Meperidine, Mirabegron, and Latex      Review of Systems   Constitutional:  Positive for unexpected weight change (11 lbs the past 2 yrs). Negative for appetite change.   HENT:  Negative for sore throat and trouble swallowing.    Eyes:  Negative for redness.   Respiratory:  Negative for cough and choking.    Cardiovascular:  Negative for chest pain.   Gastrointestinal:  Positive for abdominal pain (epigastric pain). Negative for blood in stool, constipation, diarrhea, nausea and vomiting.   Genitourinary:  Negative for dysuria and hematuria.   Musculoskeletal:  Negative for joint " swelling.   Skin:  Negative for pallor and rash.   Neurological:  Negative for weakness.   Hematological:  Does not bruise/bleed easily.   Psychiatric/Behavioral:  Negative for agitation, confusion and dysphoric mood.           /68   Pulse 55   Temp 36.4 °C (97.6 °F)   Wt 58.1 kg (128 lb 1.6 oz)   SpO2 98%   BMI 21.99 kg/m²   Physical Exam  Vitals reviewed.   Constitutional:       General: She is not in acute distress.     Appearance: Normal appearance. She is normal weight. She is not ill-appearing.   HENT:      Head: Normocephalic and atraumatic.      Mouth/Throat:      Mouth: Mucous membranes are moist.      Pharynx: Oropharynx is clear. No oropharyngeal exudate or posterior oropharyngeal erythema.   Eyes:      General: No scleral icterus.     Pupils: Pupils are equal, round, and reactive to light.   Cardiovascular:      Rate and Rhythm: Normal rate and regular rhythm.      Heart sounds: Normal heart sounds.   Pulmonary:      Effort: Pulmonary effort is normal. No respiratory distress.      Breath sounds: Normal breath sounds.   Abdominal:      General: Bowel sounds are normal. There is no distension.      Palpations: Abdomen is soft.      Tenderness: There is no abdominal tenderness. There is no guarding or rebound.   Musculoskeletal:         General: No swelling or deformity.      Cervical back: Neck supple.   Lymphadenopathy:      Cervical: No cervical adenopathy.   Skin:     General: Skin is warm.      Coloration: Skin is not jaundiced.      Findings: No rash.   Neurological:      General: No focal deficit present.      Mental Status: She is alert. Mental status is at baseline.   Psychiatric:         Mood and Affect: Mood normal.         Behavior: Behavior normal.         Thought Content: Thought content normal.       Relevant Results    Assessment/Plan:      1) Epigastric pain with postprandial BM urgency -  Will obtain CMP, celiac abs, fecal elastase.  Would like pt to undergo EGD to assess HH as  well as biopsy duodenum to assess small intestine.  Benefits and risk of procedure including infection, bleeding, reaction to sedation  and bowel perforation ( estimated of 1 of every 2,500) was discussed with pt and voiced understanding and agreed to proceed.  All questions were answered.   Will also obtain RUQ ultrasound to assess for cholelithiasis.    2) Unintentional weight loss -  Awaiting labs and EGD results.  Will refer pt to nutritionist to have proper education on calorie/protein intake.  Pt to have CT chest follow up imaging in May 2024.     Pt to follow up after endoscopy completed.    Aniya Baeza PA-C

## 2025-03-31 ENCOUNTER — OFFICE VISIT (OUTPATIENT)
Dept: GASTROENTEROLOGY | Facility: HOSPITAL | Age: 70
End: 2025-03-31
Payer: MEDICARE

## 2025-03-31 VITALS
OXYGEN SATURATION: 98 % | WEIGHT: 128.1 LBS | BODY MASS INDEX: 21.99 KG/M2 | TEMPERATURE: 97.6 F | HEART RATE: 55 BPM | SYSTOLIC BLOOD PRESSURE: 111 MMHG | DIASTOLIC BLOOD PRESSURE: 68 MMHG

## 2025-03-31 DIAGNOSIS — K90.9 INTESTINAL MALABSORPTION, UNSPECIFIED TYPE (HHS-HCC): ICD-10-CM

## 2025-03-31 DIAGNOSIS — R63.4 WEIGHT LOSS, NON-INTENTIONAL: ICD-10-CM

## 2025-03-31 DIAGNOSIS — R10.13 EPIGASTRIC PAIN: Primary | ICD-10-CM

## 2025-03-31 DIAGNOSIS — R10.13 POSTPRANDIAL EPIGASTRIC PAIN: ICD-10-CM

## 2025-03-31 PROCEDURE — 1157F ADVNC CARE PLAN IN RCRD: CPT | Performed by: PHYSICIAN ASSISTANT

## 2025-03-31 PROCEDURE — 1036F TOBACCO NON-USER: CPT | Performed by: PHYSICIAN ASSISTANT

## 2025-03-31 PROCEDURE — 1111F DSCHRG MED/CURRENT MED MERGE: CPT | Performed by: PHYSICIAN ASSISTANT

## 2025-03-31 PROCEDURE — 99214 OFFICE O/P EST MOD 30 MIN: CPT | Performed by: PHYSICIAN ASSISTANT

## 2025-03-31 PROCEDURE — 1126F AMNT PAIN NOTED NONE PRSNT: CPT | Performed by: PHYSICIAN ASSISTANT

## 2025-03-31 PROCEDURE — 1159F MED LIST DOCD IN RCRD: CPT | Performed by: PHYSICIAN ASSISTANT

## 2025-03-31 PROCEDURE — 99204 OFFICE O/P NEW MOD 45 MIN: CPT | Performed by: PHYSICIAN ASSISTANT

## 2025-03-31 RX ORDER — LIFITEGRAST 50 MG/ML
1 SOLUTION/ DROPS OPHTHALMIC 2 TIMES DAILY
COMMUNITY
Start: 2024-11-29

## 2025-03-31 SDOH — ECONOMIC STABILITY: FOOD INSECURITY: WITHIN THE PAST 12 MONTHS, THE FOOD YOU BOUGHT JUST DIDN'T LAST AND YOU DIDN'T HAVE MONEY TO GET MORE.: NEVER TRUE

## 2025-03-31 SDOH — ECONOMIC STABILITY: FOOD INSECURITY: WITHIN THE PAST 12 MONTHS, YOU WORRIED THAT YOUR FOOD WOULD RUN OUT BEFORE YOU GOT MONEY TO BUY MORE.: NEVER TRUE

## 2025-03-31 ASSESSMENT — ENCOUNTER SYMPTOMS
SORE THROAT: 0
BLOOD IN STOOL: 0
DYSPHORIC MOOD: 0
DYSURIA: 0
CHOKING: 0
DIARRHEA: 0
VOMITING: 0
APPETITE CHANGE: 0
CONFUSION: 0
TROUBLE SWALLOWING: 0
EYE REDNESS: 0
WEAKNESS: 0
AGITATION: 0
HEMATURIA: 0
UNEXPECTED WEIGHT CHANGE: 1
JOINT SWELLING: 0
COUGH: 0
NAUSEA: 0
CONSTIPATION: 0
ABDOMINAL PAIN: 1
BRUISES/BLEEDS EASILY: 0

## 2025-03-31 ASSESSMENT — LIFESTYLE VARIABLES
HOW OFTEN DO YOU HAVE A DRINK CONTAINING ALCOHOL: MONTHLY OR LESS
HOW MANY STANDARD DRINKS CONTAINING ALCOHOL DO YOU HAVE ON A TYPICAL DAY: 1 OR 2
AUDIT-C TOTAL SCORE: 1
HOW OFTEN DO YOU HAVE SIX OR MORE DRINKS ON ONE OCCASION: NEVER
SKIP TO QUESTIONS 9-10: 1

## 2025-03-31 ASSESSMENT — PAIN SCALES - GENERAL: PAINLEVEL_OUTOF10: 0-NO PAIN

## 2025-03-31 ASSESSMENT — PATIENT HEALTH QUESTIONNAIRE - PHQ9
2. FEELING DOWN, DEPRESSED OR HOPELESS: NOT AT ALL
1. LITTLE INTEREST OR PLEASURE IN DOING THINGS: NOT AT ALL
SUM OF ALL RESPONSES TO PHQ9 QUESTIONS 1 & 2: 0

## 2025-03-31 NOTE — PATIENT INSTRUCTIONS
Please go to lab to get blood work and get stool kit to collect stool test.    Please call [phone] to schedule your right upper quadrant ultrasound    Please call [phone] to schedule your upper scope.     Schedule your follow up with me at least 2 wks after scope is done.

## 2025-04-01 ENCOUNTER — HOSPITAL ENCOUNTER (OUTPATIENT)
Dept: RESPIRATORY THERAPY | Facility: HOSPITAL | Age: 70
Discharge: HOME | End: 2025-04-01
Payer: MEDICARE

## 2025-04-01 DIAGNOSIS — R06.02 SHORTNESS OF BREATH: ICD-10-CM

## 2025-04-01 LAB
ALBUMIN SERPL-MCNC: 4.6 G/DL (ref 3.6–5.1)
ALP SERPL-CCNC: 54 U/L (ref 37–153)
ALT SERPL-CCNC: 17 U/L (ref 6–29)
ANION GAP SERPL CALCULATED.4IONS-SCNC: 6 MMOL/L (CALC) (ref 7–17)
AST SERPL-CCNC: 24 U/L (ref 10–35)
BILIRUB SERPL-MCNC: 0.6 MG/DL (ref 0.2–1.2)
BUN SERPL-MCNC: 13 MG/DL (ref 7–25)
CALCIUM SERPL-MCNC: 10.1 MG/DL (ref 8.6–10.4)
CHLORIDE SERPL-SCNC: 104 MMOL/L (ref 98–110)
CO2 SERPL-SCNC: 30 MMOL/L (ref 20–32)
CREAT SERPL-MCNC: 0.74 MG/DL (ref 0.6–1)
EGFRCR SERPLBLD CKD-EPI 2021: 87 ML/MIN/1.73M2
GLIADIN IGA SER IA-ACNC: <1 U/ML
GLIADIN IGG SER IA-ACNC: <1 U/ML
GLUCOSE SERPL-MCNC: 93 MG/DL (ref 65–99)
IGA SERPL-MCNC: 161 MG/DL (ref 70–320)
POTASSIUM SERPL-SCNC: 4.3 MMOL/L (ref 3.5–5.3)
PROT SERPL-MCNC: 7 G/DL (ref 6.1–8.1)
SODIUM SERPL-SCNC: 140 MMOL/L (ref 135–146)
TTG IGA SER-ACNC: <1 U/ML
TTG IGG SER-ACNC: <1 U/ML

## 2025-04-01 PROCEDURE — 94726 PLETHYSMOGRAPHY LUNG VOLUMES: CPT | Performed by: INTERNAL MEDICINE

## 2025-04-01 PROCEDURE — 94726 PLETHYSMOGRAPHY LUNG VOLUMES: CPT

## 2025-04-01 PROCEDURE — 94060 EVALUATION OF WHEEZING: CPT | Performed by: INTERNAL MEDICINE

## 2025-04-01 PROCEDURE — 94729 DIFFUSING CAPACITY: CPT | Performed by: INTERNAL MEDICINE

## 2025-04-04 ENCOUNTER — HOSPITAL ENCOUNTER (OUTPATIENT)
Dept: RADIOLOGY | Facility: CLINIC | Age: 70
Discharge: HOME | End: 2025-04-04
Payer: MEDICARE

## 2025-04-04 DIAGNOSIS — R10.13 POSTPRANDIAL EPIGASTRIC PAIN: ICD-10-CM

## 2025-04-04 PROCEDURE — 76705 ECHO EXAM OF ABDOMEN: CPT

## 2025-04-08 LAB — ELASTASE PANC STL-MCNT: >800 MCG/G

## 2025-04-09 ENCOUNTER — TELEPHONE (OUTPATIENT)
Dept: PRIMARY CARE | Facility: CLINIC | Age: 70
End: 2025-04-09

## 2025-04-09 DIAGNOSIS — J15.9 COMMUNITY ACQUIRED BACTERIAL PNEUMONIA: ICD-10-CM

## 2025-04-11 ENCOUNTER — APPOINTMENT (OUTPATIENT)
Dept: GASTROENTEROLOGY | Facility: HOSPITAL | Age: 70
End: 2025-04-11
Payer: MEDICARE

## 2025-04-14 ENCOUNTER — PATIENT OUTREACH (OUTPATIENT)
Dept: CARE COORDINATION | Facility: CLINIC | Age: 70
End: 2025-04-14
Payer: MEDICARE

## 2025-04-14 ENCOUNTER — DOCUMENTATION (OUTPATIENT)
Dept: CARE COORDINATION | Facility: CLINIC | Age: 70
End: 2025-04-14
Payer: MEDICARE

## 2025-04-14 NOTE — PROGRESS NOTES
Outreach call to patient to check in 30 days after hospital discharge to support smooth transition of care.  Patient has follow up appointments scheduled to assess lung health,  Patient has requested to be referred to a  for resources for senior apartments and moving help.  Patient would like for CM to continue following her for support.  CM will continue to follow patient and transition her to LTCM.      SHYLA ChavezN, RN   [phone]   ACO Department

## 2025-04-14 NOTE — PROGRESS NOTES
ACO RN REFERRAL:  Patient has requested to be referred to a  for resources for senior apartments and moving help.         Patient was transferred to community health worker (CHW)/Honey for assistance.    Rachele HERMANW

## 2025-04-16 ENCOUNTER — APPOINTMENT (OUTPATIENT)
Dept: RHEUMATOLOGY | Facility: CLINIC | Age: 70
End: 2025-04-16
Payer: MEDICARE

## 2025-04-16 ENCOUNTER — PATIENT OUTREACH (OUTPATIENT)
Dept: CARE COORDINATION | Facility: CLINIC | Age: 70
End: 2025-04-16

## 2025-04-16 NOTE — PROGRESS NOTES
Patient explained she is looking for senior complex on a lower floor. Shared resources to a few complexes in nearby area.     [facility]  [address] [place] [address]  [phone]    [facility]  [address] [place] [address]  [phone]    [facility]   [address] [place] [address]  [phone]    [facility]  [address] [place] [address]  [phone]    [facility]  [address] [place] [address]  [phone]    [facility]  [address] [place] [address]  [phone]    [facility]  [address] [place] [address]  [phone]

## 2025-04-17 RX ORDER — BUDESONIDE AND FORMOTEROL FUMARATE DIHYDRATE 160; 4.5 UG/1; UG/1
2 AEROSOL RESPIRATORY (INHALATION)
Qty: 30.6 G | Refills: 3 | Status: SHIPPED | OUTPATIENT
Start: 2025-04-17

## 2025-04-30 ENCOUNTER — PATIENT OUTREACH (OUTPATIENT)
Dept: CARE COORDINATION | Facility: CLINIC | Age: 70
End: 2025-04-30
Payer: MEDICARE

## 2025-04-30 NOTE — PROGRESS NOTES
Outreach call to patient for after follow up with [facility] CHW and  pharmacy.  Patient did outreach to Dr. Lagunas office in regards should continuing to take inhalers from her hospitalization which patient states she has not received a callback, but she did call  [facility] and spoke with a pharmacist in regards to her inhalers and was told she needed to use them for the next 3 months.  Patient did receive a list of apartments from [facility] and additional information.  Patient has upcoming appointments with Radiology and nutritionist, CM encouraged patient to make a follow up with PCP which patient verbally agreed.  CM will competed another outreach to patient in a few weeks.    SHYLA ChavezN, RN   [phone]   ACO Department

## 2025-05-05 ENCOUNTER — HOSPITAL ENCOUNTER (OUTPATIENT)
Dept: RADIOLOGY | Facility: CLINIC | Age: 70
Discharge: HOME | End: 2025-05-05
Payer: MEDICARE

## 2025-05-05 DIAGNOSIS — J18.9 PNEUMONIA DUE TO INFECTIOUS ORGANISM, UNSPECIFIED LATERALITY, UNSPECIFIED PART OF LUNG: ICD-10-CM

## 2025-05-05 PROCEDURE — 71250 CT THORAX DX C-: CPT

## 2025-05-05 PROCEDURE — 71250 CT THORAX DX C-: CPT | Performed by: RADIOLOGY

## 2025-05-06 NOTE — PROGRESS NOTES
Nutrition Initial Assessment:     Patient Agnes Vasquez is a 70 y.o. female being seen at [facility] who was referred by     Aniya Baeza PA-C    on 3/31/2025 for   1. Weight loss, non-intentional  Referral to Nutrition Services      2. Dietary counseling and surveillance              Nutrition Assessment    Problem List[1]      Nutrition History:  Food & Nutrition History: Pt reports losing weight over the past year, unintentional about 10-15#. She reports always being stable around 138# and now she is falling at around 125#. Pt reports regular bowl movements but always having the urgency to go about 1-2 hours after eating. She reports she has been working with GI and has an endoscopy the end of this month to rule out any abnormalities. Pt reports no history of eating problems, has a great appetite and she exercises to keep herself healthy.   Food Allergies: None;  Food Intolerances: Lactose  Vitamin/mineral intake: Vitamin A, Vitamin D, Multivitamin , Calcium, Magnesium  Herbal supplements: None  Medication and Complementary/Alternative Medicine Use: none  GI Symptoms: GI Symptoms : fullness in abdomen after eating, urge to go always after eating.   Mouth Issues: Oral Problems: denies; Teeth Issues: Dentition : own  Sleep Habits: 5-6 hours disrupted, 7+ hours continuous, sometimes feels the need to use the bathroom in the middle of the night.     Diet Recall: wake-up: 730 am -8 am  Meal 1: 830 am: eggs (2), toast- monica bread(almond butter, avocado), fruit, greek yogurt no sugar, gallagher (occasionally)   Snack: skip  Meal 2: 2:00pm: cold cut sandwich, shereen noodles, salad with chicken breast cut up   Snack: skip  Meal 3: 730pm: sweet potato, lamb chop, broccoli, asparagus   Snack: low sodium potato chips with avocado dip, hummus with p\ricarda crackers, carrots and celery sticks  Food Variety: present  Oral Nutrition Supplement Use: Ensure Plus (350 calories and 13gm protein each)  irregularly  Fluid Intake: filtered water (2 liters), coffee with creamer, variety of herbal teas, cranberry juice, orange juice   Energy Intake: Good (>/= 75% EEN)    Food Preparation:  Cooking: Patient  Grocery Shopping: Patient  Dining Out: 1 to 3 times a month    Physical Activity:   Pt reports she does sliver sneakers- cardio- 30 minutes (twice a week) Pt reports she lives on the 8th floor of her apartment and always takes the stairs. Also, she reports she likes to go on walks in the park    Food Security/Insecurity: none    Anthropometrics:                            Weight History:   Daily Weight  03/31/25 : 58.1 kg (128 lb 1.6 oz)  03/07/25 : 59 kg (130 lb)  11/20/24 : 58.5 kg (129 lb)  11/04/24 : 57.2 kg (126 lb)  04/17/24 : 63 kg (138 lb 14.2 oz)  12/14/23 : 61.7 kg (136 lb 0.4 oz)  09/15/23 : 63 kg (139 lb)  02/13/20 : 60.8 kg (133 lb 15.9 oz)  04/18/19 : 59 kg (130 lb 1.1 oz)    Weight Change %: no significant weight changes despite the ~10 pound weight loss over the last year.        Nutrition Focused Physical Exam Findings:  Subcutaneous Fat Loss:   Orbital Fat Pads: Mild-Moderate (slight dark circles and slight hollowing)  Buccal Fat Pads: Mild-Moderate (flat cheeks, minimal bounce)  Triceps: Mild-Moderate (less than ample fat tissue)    Muscle Wasting:  Temporalis: Mild-Moderate (slight depression)  Pectoralis (Clavicular Region): Mild-Moderate (some protrusion of clavicle)  Deltoid/Trapezius: Mild-Moderate (slight protrusion of acromion process)  Interosseous: Defer  Quadriceps: Defer  Gastrocnemius: Defer    Physical Findings:  Hair: Negative  Eyes: Negative  Nails: Negative  Skin: Negative  Respiratory: Negative  Edema: none      Nutrition Significant Labs:  Last Chem:     Chemistry    Lab Results   Component Value Date/Time     03/31/2025 1214    K 4.3 03/31/2025 1214     03/31/2025 1214    CO2 30 03/31/2025 1214    BUN 13 03/31/2025 1214    CREATININE 0.74 03/31/2025 1214    Lab  Results   Component Value Date/Time    CALCIUM 10.1 03/31/2025 1214    ALKPHOS 54 03/31/2025 1214    AST 24 03/31/2025 1214    ALT 17 03/31/2025 1214    BILITOT 0.6 03/31/2025 1214       , CMP Trend:    Recent Labs     03/31/25  1214 03/10/25  0636 03/09/25  0646 03/14/24  1029 01/05/23  1121 03/17/21  1238   GLUCOSE 93 87 83   < > 101* 104*    140 140   < > 144 143   K 4.3 4.4 4.0   < > 4.7 4.2    108* 104   < > 105 106   CO2 30 24 28   < > 30 27   ANIONGAP 6* 12 12   < > 14 14   BUN 13 12 10   < > 12 16   CREATININE 0.74 0.84 0.76   < > 0.88 0.91   EGFR 87 75 84   < >  --   --    CALCIUM 10.1 9.2 9.0   < > 10.2 9.9   ALBUMIN 4.6 3.8 3.8   < > 4.7 4.8   ALKPHOS 54  --   --   --  56 68   PROT 7.0  --   --   --  7.0 7.1   AST 24  --   --   --  21 23   BILITOT 0.6  --   --   --  0.7 0.6   ALT 17  --   --   --  17 16    < > = values in this interval not displayed.   , CBC Trend:   Recent Labs     03/10/25  0636 03/09/25  0646 03/08/25  1317   WBC 4.1* 6.0 8.6   NRBC 0.0 0.0 0.0   RBC 4.09 4.17 4.13   HGB 11.9* 12.3 12.2   HCT 37.3 37.9 35.6*   MCV 91 91 86   MCH 29.1 29.5 29.5   MCHC 31.9* 32.5 34.3   RDW 14.8* 14.9* 14.5    154 126*   , RFP + Serum Mag Trend:   Recent Labs     03/31/25  1214 03/10/25  0636 03/09/25  0646 03/08/25  1317 03/08/25  0444   GLUCOSE 93 87 83 149* 125*    140 140 137 137   K 4.3 4.4 4.0 3.3* 3.8    108* 104 101 103   CO2 30 24 28 26 25   ANIONGAP 6* 12 12 13 13   BUN 13 12 10 12 9   CREATININE 0.74 0.84 0.76 0.84 0.84   EGFR 87 75 84 75 75   CALCIUM 10.1 9.2 9.0 9.0 8.4*   PHOS  --  3.9 3.0 3.0 3.5   ALBUMIN 4.6 3.8 3.8 4.0 3.8   MG  --  2.08 2.37  --  2.19   , LFT Trend:   Recent Labs     03/31/25  1214 03/10/25  0636 03/09/25  0646 03/08/25  0444 01/05/23  1121 03/17/21  1238   ALBUMIN 4.6 3.8 3.8   < > 4.7 4.8   BILITOT 0.6  --   --   --  0.7 0.6   BILIDIR  --   --   --   --   --  0.1   ALKPHOS 54  --   --   --  56 68   ALT 17  --   --   --  17 16   AST 24   "--   --   --  21 23   PROT 7.0  --   --   --  7.0 7.1    < > = values in this interval not displayed.   , DM Specific Labs Trend (Includes HgbA1C, antibodies & fasting insulin):   Recent Labs     07/10/24  0945 01/05/23  1121 03/02/20  1522   HGBA1C 5.9* 5.8* 5.8   , Lipid Panel Trend:    Recent Labs     03/14/24  1029 01/05/23  1121 03/17/21  1238 03/02/20  1522   CHOL 235* 236* 215* 215*   HDL 80.1 91.7 82.7 83.1   LDLCALC 135*  --   --   --    LDLF  --  131* 120* 122*   VLDL 20 13 12 10   TRIG 100 65 60 52   , Iron Panel + Serum Ferritin Trend:   Recent Labs     02/04/19  0924   IRON 116   TIBC 394   IRONSAT 29   , Vitamin B12: No results found for: \"YRQHHSWX92\" , Folate: No results found for: \"FOLATE\" , Vitamin D:   Lab Results   Component Value Date    VITD25 34 01/05/2023    , and CRP Trend (last 3): No results found for: \"HSCRP\"     Medications:  Current Outpatient Medications   Medication Instructions    albuterol (Ventolin HFA) 90 mcg/actuation inhaler 2 puffs, inhalation, Every 4 hours PRN    aspirin 81 mg, Every evening    budesonide-formoterol (Symbicort) 160-4.5 mcg/actuation inhaler 2 puffs, inhalation, 2 times daily RT, Rinse mouth with water after use to reduce aftertaste and incidence of candidiasis. Do not swallow.    calcium carbonate-vitamin D3 500 mg-5 mcg (200 unit) tablet 1 tablet, Every morning    hydrocortisone 2.5 % ointment Topical, 2 times daily PRN    meloxicam (MOBIC) 7.5 mg, oral, Daily PRN    multivitamin capsule 1 capsule, Every morning    Opzelura 1.5 % cream APPLY TO AFFECTED AREAS TWICE DAILY - NOT TO EXCEED 2 GRAMS PER DAY.    Xiidra 5 % dropperette 1 drop, 2 times daily        Estimated Needs:   ; Method for Estimating Needs: 58.1 kg (25-30)= 7733-4429 kcal   ; Method for Estimating 24 Hour Protein Needs: 58.1 kg (1.2-1.4)= 67-81g   ;     ;                    Nutrition Diagnosis   Malnutrition Diagnosis  Patient has Malnutrition Diagnosis: No (at risk for " malnutrition)    Nutrition Diagnosis  Patient has Nutrition Diagnosis: Yes  Diagnosis Status (1): New  Nutrition Diagnosis 1: Inadequate protein energy intake  Related to (1): metabolic demand  As Evidenced by (1): diet recall, lost of 10-15# over the past year, mild-moderate muscle and fat wasting       Nutrition Interventions/Recommendations   Nutrition Prescription: Oral nutrition General Healthy diet with focus on increased calorie and protein intake to help with desired wt gain    Nutrition Interventions:   Food and Nutrient Delivery: Meals & Snacks: Energy-modified diet, General Healthful Diet, Protein-modified diet     Coordination of Care:   none    Nutrition Education:   Nutrition Education Content: Content related nutrition education  High calorie, high protein diet; adding kcal boosters to foods; 5-6 small meals throughout the day to increase appetite & PO intake; addition of high calorie/protein ONS; monitoring wt once a week to assess for desired wt gain    Nutrition Education Topics Discussed:   We discussed the importance of increased calories and protein with current disease state.    Plan to eat 3 meals and 3 snacks daily.  [place] with timing meals to find out when you have a larger appetite.  Appetite may be greatest in the morning after not eating all night so you may prefer to eat your larger meals and snacksin the morning and at lunch.  Breakfast-type foods are often better tolerated so eat foods such as eggs, pancakes, waffles and cereal for any meal or snack.  Carry snacks with you so you are prepared to eat every 2 to 3 hours.  Determine what works best for you if your body's cues for feeling hungry or full are not working.  Eat a small meal or snack even if you don't feel hungry.  Set a timer to remind you when it is time to eat.  Take a walk before you eat (with health care provider's approval).  Light or moderate physical activity can help you maintain muscle and increase your  appetite.  Adding calories  Drink milk, chocolate milk, soy milk, or smoothies instead of low-calorie beverages such as diet drinks or water.  Cook with milk or soy milk instead of water when making dishes such as hot cereal, cocoa, or pudding.  Add jelly, jam, honey, butter or margarine to bread and crackers. Add jam or fruit to ice cream and as a topping over cake.  Mix dried fruit, nuts, granola, honey, or dry cereal with yogurt or hot cereals.  Enjoy snacks such as milkshakes, smoothies, pudding, ice cream, or custard.  Blend a fruit smoothie of a banana, frozen berries, milk or soy milk, and 1 tablespoon nonfat powdered milk or protein powder.    Adding Protein  Add ¼ cup nonfat dry milk powder or protein powder to make a high-protein milk to drink or to use in recipes that call for milk.  Vanilla or peppermint extract or unsweetened cocoa powder could help to boost the flavor.  Add hard-cooked eggs, leftover meat, grated cheese, canned beans or tofu to noodles, rice, salads, sandwiches, soups,  casseroles, pasta, tuna and other mixed dishes.  Add powdered milk or protein powder to hot cereals, meatloaf, casseroles, scrambled eggs, sauces, cream soups, and shakes.  Add beans and lentils to salads, soups, casseroles, and vegetable dishes.  Eat cottage cheese or yogurt, especially Greek yogurt, with fruit as a snack or dessert.     Educational Handouts Provided: NCM High Calorie, High Protein MNT , NCM High Calorie, High Protein Recipes , and High Calorie, High Protein Recipe Handouts from [facility]      Nutrition Counseling:   Nutrition Counseling Strategies : Nutrition counseling based on motivational interviewing strategy, Nutrition counseling based on goal setting strategy    Patient Goals:  To start eating 3 meals and 3 snack per day    To drink 3 Ensure plus or Boost plus drinks per day.     Readiness to Change : Good  Level of Understanding : Good  Anticipated Compliant : Good         Nutrition Monitoring and  Evaluation   Food and Nutrient Intake  Monitoring and Evaluation Plan: Energy intake  Energy Intake: Estimated energy intake  Criteria: PO intake meeting >75% EEN         Anthropometric measurements  Monitoring and Evaluation Plan: Weight  Criteria: Monitor patient's progress towards desired wt gain; aim for 0.5 to 1.0# wt gain per week until desired wt achieved              Goal Status:           Follow Up: 6-8 weeks               [1]   Patient Active Problem List  Diagnosis    Urge incontinence    Thickened endometrium    Poor manual dexterity    Pelvic cramping    Palpitation    Numbness and tingling in left hand    Muscular incoordination    Lichen simplex chronicus    Irritable bowel syndrome    Generalized anxiety disorder    Disturbance of skin sensation    Dermatophytosis, nail    Dermatologic disease    Dense breast tissue    Cystocele, midline    Cervical stenosis (uterine cervix)    Carpal tunnel syndrome of left wrist    Cardiomyopathy, nonischemic (Multi)    Cardiology follow-up encounter    Acquired spondylolisthesis    Depressive disorder    Lumbar radiculopathy, right    Weight loss, non-intentional    Myalgia    Disuse muscle atrophy    Other fatigue    Community acquired bacterial pneumonia    Dietary counseling and surveillance

## 2025-05-07 ENCOUNTER — NUTRITION (OUTPATIENT)
Dept: NUTRITION | Facility: HOSPITAL | Age: 70
End: 2025-05-07
Payer: MEDICARE

## 2025-05-07 DIAGNOSIS — Z71.3 DIETARY COUNSELING AND SURVEILLANCE: ICD-10-CM

## 2025-05-07 DIAGNOSIS — R63.4 WEIGHT LOSS, NON-INTENTIONAL: Primary | ICD-10-CM

## 2025-05-07 PROCEDURE — 97802 MEDICAL NUTRITION INDIV IN: CPT

## 2025-05-14 ENCOUNTER — PATIENT OUTREACH (OUTPATIENT)
Dept: CARE COORDINATION | Facility: CLINIC | Age: 70
End: 2025-05-14
Payer: MEDICARE

## 2025-05-14 NOTE — PROGRESS NOTES
Outreach call to patient following up on appointment with Dietician Unable to reach patient.  Will continue to follow.     SHYLA ChavezN, RN   [phone]   ACO Department

## 2025-05-15 NOTE — PROGRESS NOTES
"Subjective   Patient ID: Agnes Vasquez is a 70 y.o. female who presents for Follow-up ([facility]-  for Spondylolisthesis.).    HPI    At last visit suggested NAVARRETE  Had pneumonia 2025  CT abd ok  To have upper endoscopy soon   Colonoscopy ok   Normal Celiac labs     Now neck and ankle pain  Hands not working ankle locked up  Ankles up    Active ROM and passive continues to be normal  Hands normal   Some LOM c spine    PT for c spine   Locking up hands     ---------------------------------------------------------------------------------  Consult   \"Myalgia\"  Pain Shoulders, arms torso, upper legs     Normal wsr crp tsh cpk     Losing 12 pounds past 3 mos  Eats and in 2 hours has BM 4 BM daily   Asks for Colagard she ordered it on line generic as not approved by PCP   I am not digesting my food  No dysphagia     Pain  also in 3 mos  Shoulder and l side on body UE and LE   Upper torso is hurting  Neck   Constant throb     No joint swelling     I am losing muscle mass entire body    Some fatigue in muscles     LBP for years worse   Tx meloxicam         ROS      Current Outpatient Medications   Medication Sig Dispense Refill    albuterol (Ventolin HFA) 90 mcg/actuation inhaler Inhale 2 puffs every 4 hours if needed for wheezing or shortness of breath. 8.5 g 5    aspirin 81 mg EC tablet Take 1 tablet (81 mg) by mouth once daily in the evening.      calcium carbonate-vitamin D3 500 mg-5 mcg (200 unit) tablet Take 1 tablet by mouth once daily in the morning.      hydrocortisone 2.5 % ointment Apply topically 2 times a day as needed for irritation or rash. 30 g 0    meloxicam (Mobic) 7.5 mg tablet TAKE 1 TABLET (7.5 MG) BY MOUTH ONCE DAILY AS NEEDED FOR MODERATE PAIN (4 - 6). 30 tablet 0    multivitamin capsule Take 1 capsule by mouth once daily in the morning.      Opzelura 1.5 % cream APPLY TO AFFECTED AREAS TWICE DAILY - NOT TO EXCEED 2 GRAMS PER DAY. 60 g 11    Xiidra 5 % dropperette Administer 1 drop into both eyes 2 " times a day.      budesonide-formoterol (Symbicort) 160-4.5 mcg/actuation inhaler Inhale 2 puffs 2 times a day. Rinse mouth with water after use to reduce aftertaste and incidence of candidiasis. Do not swallow. (Patient not taking: Reported on 5/16/2025) 30.6 g 3     No current facility-administered medications for this visit.         has no past medical history on file.   Past Surgical History:   Procedure Laterality Date    BREAST BIOPSY Left     excisional benign bx      Social History     Tobacco Use    Smoking status: Former     Types: Cigarettes    Smokeless tobacco: Never   Substance Use Topics    Alcohol use: Yes     Comment: occasional    Drug use: Never      Family history is unknown by patient.  Pain Management Panel           No data to display                 Vitals:    05/16/25 0954   BP: 137/84   Pulse: 82   SpO2: 96%       Lab Results   Component Value Date    SEDRATE 7 10/02/2024    CRP 0.11 10/02/2024    TSH 0.94 03/14/2024       PHYSICAL EXAM      NODES all -  HEART rrr no M  LUNGS all whaley clear  ABDOMEN no hepar or palp spleen  VASCULAR 2+  NEURO no muscle weakness UE or LE Prox or distal   Gait is normal down hallway no fasciculation body or tongue  No observed muscle atrophy   SKIN -  JOINTS no sy    PLAN    This is a follow-up appointment as I last saw her for consultation about 8 months ago    She has had all the necessary tests and they have been unrevealing by primary care or GI excetra    Today's exam continues to reveal myofascial pain with very tight areas around her trapezius and upper back throughout her spine.    We discussed that she might have some osteoarthritis but she also has family stressors    I suggested physical therapy for the cervical spine and to look into a psychology appointment.  She will discuss this further with Dr. Lagunas her primary care doctor.

## 2025-05-16 ENCOUNTER — DOCUMENTATION (OUTPATIENT)
Dept: CARE COORDINATION | Facility: CLINIC | Age: 70
End: 2025-05-16

## 2025-05-16 ENCOUNTER — APPOINTMENT (OUTPATIENT)
Dept: RHEUMATOLOGY | Facility: CLINIC | Age: 70
End: 2025-05-16
Payer: MEDICARE

## 2025-05-16 VITALS
DIASTOLIC BLOOD PRESSURE: 84 MMHG | WEIGHT: 132 LBS | BODY MASS INDEX: 22.66 KG/M2 | OXYGEN SATURATION: 96 % | SYSTOLIC BLOOD PRESSURE: 137 MMHG | HEART RATE: 82 BPM

## 2025-05-16 DIAGNOSIS — M79.10 MYALGIA: Primary | ICD-10-CM

## 2025-05-16 DIAGNOSIS — M54.2 NECK PAIN: ICD-10-CM

## 2025-05-16 PROCEDURE — 1159F MED LIST DOCD IN RCRD: CPT | Performed by: INTERNAL MEDICINE

## 2025-05-16 PROCEDURE — 99214 OFFICE O/P EST MOD 30 MIN: CPT | Performed by: INTERNAL MEDICINE

## 2025-05-16 NOTE — PROGRESS NOTES
Patient called in stated she reached out to a rep from [facility] for assistance with adult mattress set, wanted to know if there was another contact number for assistance, because she cannot reach the person who handles furniture assistance.     Chw placed a referral with [employer] [facility] for an adult mattress set. Patient is aware of fee charges.    No further assistance needed.

## 2025-05-19 ENCOUNTER — TELEPHONE (OUTPATIENT)
Dept: PRIMARY CARE | Facility: CLINIC | Age: 70
End: 2025-05-19

## 2025-05-19 DIAGNOSIS — Z00.00 HEALTH CARE MAINTENANCE: Primary | ICD-10-CM

## 2025-05-28 ENCOUNTER — ANESTHESIA (OUTPATIENT)
Dept: GASTROENTEROLOGY | Facility: HOSPITAL | Age: 70
End: 2025-05-28
Payer: MEDICARE

## 2025-05-28 ENCOUNTER — APPOINTMENT (OUTPATIENT)
Dept: RHEUMATOLOGY | Facility: CLINIC | Age: 70
End: 2025-05-28
Payer: MEDICARE

## 2025-05-28 ENCOUNTER — HOSPITAL ENCOUNTER (OUTPATIENT)
Dept: GASTROENTEROLOGY | Facility: HOSPITAL | Age: 70
Discharge: HOME | End: 2025-05-28
Payer: MEDICARE

## 2025-05-28 ENCOUNTER — ANESTHESIA EVENT (OUTPATIENT)
Dept: GASTROENTEROLOGY | Facility: HOSPITAL | Age: 70
End: 2025-05-28
Payer: MEDICARE

## 2025-05-28 VITALS
TEMPERATURE: 97.7 F | RESPIRATION RATE: 16 BRPM | DIASTOLIC BLOOD PRESSURE: 80 MMHG | SYSTOLIC BLOOD PRESSURE: 115 MMHG | HEART RATE: 65 BPM | OXYGEN SATURATION: 96 %

## 2025-05-28 DIAGNOSIS — M62.50 DISUSE MUSCLE ATROPHY: ICD-10-CM

## 2025-05-28 DIAGNOSIS — R53.83 OTHER FATIGUE: ICD-10-CM

## 2025-05-28 DIAGNOSIS — M79.10 MYALGIA: Primary | ICD-10-CM

## 2025-05-28 DIAGNOSIS — R10.13 EPIGASTRIC PAIN: ICD-10-CM

## 2025-05-28 DIAGNOSIS — R63.4 WEIGHT LOSS, NON-INTENTIONAL: ICD-10-CM

## 2025-05-28 PROCEDURE — 7100000010 HC PHASE TWO TIME - EACH INCREMENTAL 1 MINUTE

## 2025-05-28 PROCEDURE — 3700000001 HC GENERAL ANESTHESIA TIME - INITIAL BASE CHARGE

## 2025-05-28 PROCEDURE — 1159F MED LIST DOCD IN RCRD: CPT | Performed by: INTERNAL MEDICINE

## 2025-05-28 PROCEDURE — 2500000004 HC RX 250 GENERAL PHARMACY W/ HCPCS (ALT 636 FOR OP/ED): Mod: JZ | Performed by: STUDENT IN AN ORGANIZED HEALTH CARE EDUCATION/TRAINING PROGRAM

## 2025-05-28 PROCEDURE — 3700000002 HC GENERAL ANESTHESIA TIME - EACH INCREMENTAL 1 MINUTE

## 2025-05-28 PROCEDURE — A43235 PR ESOPHAGOGASTRODUODENOSCOPY TRANSORAL DIAGNOSTIC: Performed by: STUDENT IN AN ORGANIZED HEALTH CARE EDUCATION/TRAINING PROGRAM

## 2025-05-28 PROCEDURE — 7100000009 HC PHASE TWO TIME - INITIAL BASE CHARGE

## 2025-05-28 PROCEDURE — 99214 OFFICE O/P EST MOD 30 MIN: CPT | Performed by: INTERNAL MEDICINE

## 2025-05-28 PROCEDURE — 43235 EGD DIAGNOSTIC BRUSH WASH: CPT | Performed by: STUDENT IN AN ORGANIZED HEALTH CARE EDUCATION/TRAINING PROGRAM

## 2025-05-28 RX ORDER — FENTANYL CITRATE 50 UG/ML
INJECTION, SOLUTION INTRAMUSCULAR; INTRAVENOUS AS NEEDED
Status: DISCONTINUED | OUTPATIENT
Start: 2025-05-28 | End: 2025-05-28

## 2025-05-28 RX ORDER — MIDAZOLAM HYDROCHLORIDE 1 MG/ML
INJECTION INTRAMUSCULAR; INTRAVENOUS AS NEEDED
Status: DISCONTINUED | OUTPATIENT
Start: 2025-05-28 | End: 2025-05-28

## 2025-05-28 RX ORDER — PROPOFOL 10 MG/ML
INJECTION, EMULSION INTRAVENOUS AS NEEDED
Status: DISCONTINUED | OUTPATIENT
Start: 2025-05-28 | End: 2025-05-28

## 2025-05-28 RX ADMIN — FENTANYL CITRATE 100 MCG: 50 INJECTION, SOLUTION INTRAMUSCULAR; INTRAVENOUS at 12:44

## 2025-05-28 RX ADMIN — MIDAZOLAM HYDROCHLORIDE 2 MG: 2 INJECTION, SOLUTION INTRAMUSCULAR; INTRAVENOUS at 12:44

## 2025-05-28 RX ADMIN — PROPOFOL 150 MG: 10 INJECTION, EMULSION INTRAVENOUS at 12:44

## 2025-05-28 ASSESSMENT — ENCOUNTER SYMPTOMS
ARTHRALGIAS: 0
FEVER: 0
SLEEP DISTURBANCE: 0
SHORTNESS OF BREATH: 0
JOINT SWELLING: 0
ABDOMINAL PAIN: 0
TROUBLE SWALLOWING: 0
LIGHT-HEADEDNESS: 0
BLOOD IN STOOL: 0
COUGH: 0
UNEXPECTED WEIGHT CHANGE: 0
CONSTIPATION: 0
CHILLS: 0
WHEEZING: 0
HEADACHES: 0
CONFUSION: 0
VOMITING: 0
DIZZINESS: 0
NAUSEA: 0
DIFFICULTY URINATING: 0
DIARRHEA: 0
SPEECH DIFFICULTY: 0
ABDOMINAL DISTENTION: 0
COLOR CHANGE: 0

## 2025-05-28 ASSESSMENT — COLUMBIA-SUICIDE SEVERITY RATING SCALE - C-SSRS
2. HAVE YOU ACTUALLY HAD ANY THOUGHTS OF KILLING YOURSELF?: NO
6. HAVE YOU EVER DONE ANYTHING, STARTED TO DO ANYTHING, OR PREPARED TO DO ANYTHING TO END YOUR LIFE?: NO
1. IN THE PAST MONTH, HAVE YOU WISHED YOU WERE DEAD OR WISHED YOU COULD GO TO SLEEP AND NOT WAKE UP?: NO

## 2025-05-28 ASSESSMENT — PAIN - FUNCTIONAL ASSESSMENT
PAIN_FUNCTIONAL_ASSESSMENT: 0-10

## 2025-05-28 ASSESSMENT — PAIN SCALES - GENERAL
PAINLEVEL_OUTOF10: 0 - NO PAIN
PAINLEVEL_OUTOF10: 0 - NO PAIN

## 2025-05-28 NOTE — H&P
History Of Present Illness  Agnes Vasquez is a 70 y.o. female presenting with EGD for epigastric pain and weight loss.     Past Medical History  Medical History[1]  Surgical History  Surgical History[2]  Social History  She reports that she has quit smoking. Her smoking use included cigarettes. She has never used smokeless tobacco. She reports current alcohol use. She reports that she does not use drugs.    Family History  Family History[3]     Allergies  Allergies[4]  Review of Systems   Constitutional:  Negative for chills, fever and unexpected weight change.   HENT:  Negative for congestion and trouble swallowing.    Respiratory:  Negative for cough, shortness of breath and wheezing.    Cardiovascular:  Negative for chest pain.   Gastrointestinal:  Negative for abdominal distention, abdominal pain, blood in stool, constipation, diarrhea, nausea and vomiting.   Genitourinary:  Negative for difficulty urinating.   Musculoskeletal:  Negative for arthralgias and joint swelling.   Skin:  Negative for color change.   Neurological:  Negative for dizziness, speech difficulty, light-headedness and headaches.   Psychiatric/Behavioral:  Negative for confusion and sleep disturbance.         Physical Exam  Constitutional:       General: She is awake.      Appearance: Normal appearance.   HENT:      Head: Normocephalic and atraumatic.      Nose: Nose normal.      Mouth/Throat:      Mouth: Mucous membranes are moist.   Eyes:      Pupils: Pupils are equal, round, and reactive to light.   Neck:      Thyroid: No thyroid mass.      Trachea: Phonation normal.   Cardiovascular:      Rate and Rhythm: Normal rate and regular rhythm.   Pulmonary:      Effort: Pulmonary effort is normal. No respiratory distress.      Breath sounds: Normal air entry. No decreased breath sounds, wheezing, rhonchi or rales.   Abdominal:      General: Bowel sounds are normal. There is no distension.      Palpations: Abdomen is soft.      Tenderness:  "There is no abdominal tenderness.   Musculoskeletal:      Cervical back: Neck supple.      Right lower leg: No edema.      Left lower leg: No edema.   Skin:     General: Skin is warm.      Capillary Refill: Capillary refill takes less than 2 seconds.   Neurological:      General: No focal deficit present.      Mental Status: She is alert and oriented to person, place, and time. Mental status is at baseline.      Cranial Nerves: Cranial nerves 2-12 are intact.      Motor: Motor function is intact.   Psychiatric:         Attention and Perception: Attention and perception normal.         Mood and Affect: Mood normal.         Speech: Speech normal.         Behavior: Behavior normal.          Last Recorded Vitals  There were no vitals taken for this visit.    Assessment/Plan   EGD for epigastric pain and weight loss.     Crista Fonseca MD       [1]   Past Medical History:  Diagnosis Date    Coronary artery calcification    [2]   Past Surgical History:  Procedure Laterality Date    BREAST BIOPSY Left     excisional benign bx   [3]   Family History  Family history unknown: Yes   [4]   Allergies  Allergen Reactions    Contact Metal Agent Hives     hives    Meperidine Nausea/vomiting    Mirabegron Hives and Other     Blood pressure rise  \"Contraindication-Medical Surgical\"    Latex Rash and Hives     "

## 2025-05-28 NOTE — ANESTHESIA POSTPROCEDURE EVALUATION
Patient: Agnes Vasquez    Procedure Summary       Date: 05/28/25 Room / Location: [facility]    Anesthesia Start: 1242 Anesthesia Stop: 1303    Procedure: EGD Diagnosis:       Weight loss, non-intentional      Epigastric pain    Scheduled Providers: Crista Fonseca MD; Carla Jimenez RN Responsible Provider: Dario Delarosa MD    Anesthesia Type: MAC ASA Status: 3            Anesthesia Type: MAC    Vitals Value Taken Time   BP 97/52 05/28/25 13:07   Temp 36.5 °C (97.7 °F) 05/28/25 13:07   Pulse 87 05/28/25 13:07   Resp 16 05/28/25 13:07   SpO2 96 % 05/28/25 13:07       Anesthesia Post Evaluation    Patient location during evaluation: PACU  Patient participation: complete - patient participated  Level of consciousness: sleepy but conscious  Pain management: adequate  Airway patency: patent  Cardiovascular status: acceptable  Respiratory status: acceptable  Hydration status: acceptable  Postoperative Nausea and Vomiting: none        No notable events documented.

## 2025-05-28 NOTE — ANESTHESIA PREPROCEDURE EVALUATION
Patient: Agnes Vasquez    Procedure Information       Anesthesia Start Date/Time: 05/28/25 1242    Scheduled providers: Crista Fonseca MD; Carla Jimenez RN    Procedure: EGD    Location: [facility]            Relevant Problems   Pulmonary   (+) Community acquired bacterial pneumonia      Neuro   (+) Carpal tunnel syndrome of left wrist   (+) Depressive disorder   (+) Generalized anxiety disorder   (+) Lumbar radiculopathy, right      GI   (+) Irritable bowel syndrome      Musculoskeletal   (+) Carpal tunnel syndrome of left wrist      ID   (+) Community acquired bacterial pneumonia   (+) Dermatophytosis, nail      Skin   (+) Lichen simplex chronicus       Clinical information reviewed:   Tobacco  Allergies  Meds   Med Hx  Surg Hx   Fam Hx  Soc Hx        NPO Detail:  NPO/Void Status  Date of Last Liquid: 05/27/25  Time of Last Liquid: 2100  Date of Last Solid: 05/27/25  Time of Last Solid: 2100         Physical Exam    Airway  Mallampati: II  TM distance: >3 FB  Neck ROM: full  Mouth opening: 3 or more finger widths     Cardiovascular   Rhythm: regular  Rate: normal     Dental - normal exam     Pulmonary Breath sounds clear to auscultation     Abdominal (-) obese             Anesthesia Plan    History of general anesthesia?: yes  History of complications of general anesthesia?: no    ASA 3     MAC     intravenous induction   Anesthetic plan and risks discussed with patient.    Plan discussed with CAA.

## 2025-06-23 ENCOUNTER — APPOINTMENT (OUTPATIENT)
Dept: PRIMARY CARE | Facility: CLINIC | Age: 70
End: 2025-06-23
Payer: MEDICARE

## 2025-06-23 VITALS — SYSTOLIC BLOOD PRESSURE: 130 MMHG | WEIGHT: 134 LBS | BODY MASS INDEX: 23 KG/M2 | DIASTOLIC BLOOD PRESSURE: 72 MMHG

## 2025-06-23 DIAGNOSIS — Z00.00 HEALTH CARE MAINTENANCE: Primary | ICD-10-CM

## 2025-06-23 DIAGNOSIS — K44.9 HIATAL HERNIA: ICD-10-CM

## 2025-06-23 DIAGNOSIS — E78.2 MIXED HYPERLIPIDEMIA: ICD-10-CM

## 2025-06-23 DIAGNOSIS — K21.9 GASTROESOPHAGEAL REFLUX DISEASE WITHOUT ESOPHAGITIS: ICD-10-CM

## 2025-06-23 DIAGNOSIS — R73.02 GLUCOSE INTOLERANCE (IMPAIRED GLUCOSE TOLERANCE): ICD-10-CM

## 2025-06-23 DIAGNOSIS — L65.9 HAIR LOSS: ICD-10-CM

## 2025-06-23 DIAGNOSIS — I10 PRIMARY HYPERTENSION: ICD-10-CM

## 2025-06-23 PROCEDURE — 3078F DIAST BP <80 MM HG: CPT | Performed by: INTERNAL MEDICINE

## 2025-06-23 PROCEDURE — 99214 OFFICE O/P EST MOD 30 MIN: CPT | Performed by: INTERNAL MEDICINE

## 2025-06-23 PROCEDURE — G2211 COMPLEX E/M VISIT ADD ON: HCPCS | Performed by: INTERNAL MEDICINE

## 2025-06-23 PROCEDURE — 3075F SYST BP GE 130 - 139MM HG: CPT | Performed by: INTERNAL MEDICINE

## 2025-06-23 NOTE — PROGRESS NOTES
Subjective   Patient ID: Agnes Vasquez is a 70 y.o. female who presents for No chief complaint on file..    HPI follow-up visit she was concerned about hair loss and a later appointment with the dermatologist she was concerned about her hiatal hernia and gastric symptoms she was concerned about her arthritis and medications for the same she is going to physical therapy she was concerned about her coronary artery calcium although this was seen on a chest CT she has an appointment with her cardiologist but has not yet gone    Reviewed EGD  Reviewed CT scan of chest (check)    Review of Systems    Objective   There were no vitals taken for this visit.    Physical Exam vital signs noted alert and oriented x 3 NCAT no ac nodes no JVD no lid lag no thyromegaly chest clear to auscultation and percussion no wheezing CV regular rate and rhythm S1-S2 without murmur gallop or rub abdomen soft nontender normal active bowel sounds extremities no clubbing cyanosis or edema normal distal pulses musculoskeletal DJD changes of the hands skin vitiligo thin hair    Assessment/Plan impression all diagnoses coronary artery calcifications htn hyperlipidemia status post pneumonia GERD with hiatal hernia hair loss vitiligo other diagnoses glucose intolerance.    Plan will follow up with cardiology good diet regular exercise good water consumption no tobacco loss or meloxicam or NSAIDs PPI or H2 blocker not late night eating follow-up with gastroenterology okay for biotin follow-up with dermatology consider topical minoxidil follow-up with rheumatology regarding her questions regarding rheumatoid versus osteoarthritis range of motion exercise topical liniment cream review prior laboratory results follow-up with cardiology and advised on the coronary artery calcifications and whether a dedicated score or whether she needs follow-up CT scanner regardless for the vitiligo she has sunscreen as needed again follow-up with dermatology then  recheck for regular visit Endor blood work based on above  tt40 cc21  addendum  May consider A1c in the future, lipids in the future advise on cho and lipid medication   review for coronary artery calcium CT scoring had a regular CT chest  Follow-up lobar pneumonia after a digital CT angiogram ruled out pulmonary embolism on the second scan there were some coronary artery calcifications noted nuclear scan and contrast whether CT scan of chest needs to be repeated for a CAC S9 to be done in the future  RAC

## 2025-07-01 NOTE — PROGRESS NOTES
Physical Therapy    Physical Therapy Evaluation and Treatment      Patient Name: Agnes Vasquez  MRN: [id number]  Today's Date: 7/2/25  Visit 1    Time Entry:   Time Calculation  Start Time: 1120  Stop Time: 1210  Time Calculation (min): 50 min  PT Evaluation Time Entry  PT Evaluation (Moderate) Time Entry: 30  PT Therapeutic Procedures Time Entry  Therapeutic Exercise Time Entry: 20                   Assessment:    Patient presents with clinical signs and symptoms consistent with cervical strain and UQ muscle hypertonus with active Tps without UE radicular symptom.  These impairments affect ADLs, work, recreational activity, exercise, transfer ability, ambulation, and sleep function that requires skilled PT intervention to resolve and enable patient to return to previous level of function. Factors that may affect progress in PT are chronic pain,  medical co-morbidities,  and patient compliance.  Patient response to initial treatment of education and exercise  was understood by Agnes Vasquez       Plan:  OP PT Plan  Treatment/Interventions: Aquatic therapy, Cryotherapy, Education/ Instruction, Dry needling, Hot pack, Manual therapy, Mechanical traction, Taping techniques, Self care/ home management, Therapeutic activities, Therapeutic exercises  PT Plan: Skilled PT  PT Frequency: 1 time per week  Duration: 12  Onset Date: 03/01/25  Certification Period Start Date: 07/02/25  Certification Period End Date: 09/30/25  Rehab Potential: Good  Plan of Care Agreement: Patient      Problem List Items Addressed This Visit           ICD-10-CM    Myalgia - Primary M79.10    Relevant Orders    Follow Up In Physical Therapy     Other Visit Diagnoses         Codes      Neck pain     M54.2    Relevant Orders    Follow Up In Physical Therapy           Subjective    Insidious onset neck and shoulder  pain in March 2025. She has chronic lumbar and hip pain also.Muscle rubs and exercise. She has chronic back and hip pain . She has  been treated here for LBP in past with [facility]. C/o difficulty sitting worse than standing .  General  Preferred Learning Style: visual, verbal, written  Precautions:  Precautions  STEADI Fall Risk Score (The score of 4 or more indicates an increased risk of falling): 0       Pain:   8/10 neck   Home Living:   Lives alone in apartment  Prior Level of Function:   independent ADL and drive car , able to grocery shop    Objective   Cognition:   A+ox3  Observation:  Posture : lacks thoracic kyphosis, hyperlordotic high lumbar  Shoulder joint clearance:   Clear  Cervical AROM : Flex  90 % P   , Extension  75  % P ,    , Rotation  R 70 deg P L 70  deg P      Myotomal Strength:  C4   R /5   L /5,  C5  R  /5  L  /5, C6  R  /5  L  /5,  C7   R  /5  L  /5  C8   R  /5  L  /5  T1   R  /5  L  /5    Shoulder Strength:  Abduction   R  4/5  L  4/5,   ,  Flexion R   4/5   L 4 /5  ER  R  4/5  L  4/5  IR  R  4/5  L  4/5         Sensation: normal    Segmental cervical  joint mobility:  clear unrestrcted  Palpation:  upper quarter hypertonus including cervical paraspinals,  levator scapulae,  UT , anterior scalene,  pec minor,  subscapularis,                     rectus capitus,  infraspinatus, supraspinatus, anterior scalens, and latissimus                          Special Tests:  ,  Vertical compression  +  ,  Distraction  reduces pain                              Treatments:  Sit tall front hold 10 lbs for postural re-ed and UQ load tolerance    EDUCATION:     extensive education regarding mechanism of injury, relevant functional anatomy, treatment program rational, self management, HEP, and POC    Discussed rational for TPDN as treatment option for her UQ muscle hypertonus.  Written HEP instruction handout given to Agnes Vasquez    Goals:  Reduce neck and UQ pain to 0-4/10  Scapulae posture/alignment will be symmetrical  independent hep   Improve NDI score by 2 points.  Insurance Authorization Information  Date of Evaluation:  25    Onset Date: 3/1/2025    Referring Physician: Yoel Carranza     Surgery in the Last 3 months:  no    CPT Codes: Therapeutic Exercise: 99449 Therapeutic Activity: 76348 Neuromuscular Re-Education: 86446 Manual Therapy: 04730 Gait Trainin Self-Care/Home Management Trainin Mechanical Traction: 46318 Electric Stimulation (Attended): 35924 Electric Stimulation (Unattended): 85575 Vasopneumatic Device: 95026 PT Re-Evaluation: 45918     Diagnosis:   Problem List Items Addressed This Visit           ICD-10-CM    Myalgia - Primary M79.10    Relevant Orders    Follow Up In Physical Therapy     Other Visit Diagnoses         Codes      Neck pain     M54.2    Relevant Orders    Follow Up In Physical Therapy               Functional Outcome:  Other Measures  Neck Disability Index: 15    OT / PT Evaluation complexity:  moderate    Which of the following best describes the primary reason of therapy: Improving, restoring, or adapting functional mobility or skills    Visits Requested: 12    Date Range: 90 days    Select all conditions that apply: Arthritis Conditions         Kenny Roberts, PT

## 2025-07-02 ENCOUNTER — EVALUATION (OUTPATIENT)
Dept: PHYSICAL THERAPY | Facility: CLINIC | Age: 70
End: 2025-07-02
Payer: MEDICARE

## 2025-07-02 DIAGNOSIS — M79.10 MYALGIA: Primary | ICD-10-CM

## 2025-07-02 DIAGNOSIS — M54.2 NECK PAIN: ICD-10-CM

## 2025-07-02 PROCEDURE — 97110 THERAPEUTIC EXERCISES: CPT | Mod: GP | Performed by: PHYSICAL THERAPIST

## 2025-07-02 PROCEDURE — 97162 PT EVAL MOD COMPLEX 30 MIN: CPT | Mod: GP | Performed by: PHYSICAL THERAPIST

## 2025-07-02 ASSESSMENT — ENCOUNTER SYMPTOMS
OCCASIONAL FEELINGS OF UNSTEADINESS: 0
LOSS OF SENSATION IN FEET: 0

## 2025-07-07 ENCOUNTER — APPOINTMENT (OUTPATIENT)
Dept: GASTROENTEROLOGY | Facility: HOSPITAL | Age: 70
End: 2025-07-07
Payer: MEDICARE

## 2025-07-07 VITALS
WEIGHT: 133 LBS | BODY MASS INDEX: 22.83 KG/M2 | TEMPERATURE: 97.2 F | OXYGEN SATURATION: 96 % | HEART RATE: 43 BPM | DIASTOLIC BLOOD PRESSURE: 60 MMHG | SYSTOLIC BLOOD PRESSURE: 113 MMHG

## 2025-07-07 DIAGNOSIS — R19.4 FREQUENT BOWEL MOVEMENTS: ICD-10-CM

## 2025-07-07 DIAGNOSIS — R14.0 ABDOMINAL BLOATING: Primary | ICD-10-CM

## 2025-07-07 PROCEDURE — 1126F AMNT PAIN NOTED NONE PRSNT: CPT | Performed by: PHYSICIAN ASSISTANT

## 2025-07-07 PROCEDURE — 99214 OFFICE O/P EST MOD 30 MIN: CPT | Performed by: PHYSICIAN ASSISTANT

## 2025-07-07 PROCEDURE — 1159F MED LIST DOCD IN RCRD: CPT | Performed by: PHYSICIAN ASSISTANT

## 2025-07-07 PROCEDURE — 99212 OFFICE O/P EST SF 10 MIN: CPT | Performed by: PHYSICIAN ASSISTANT

## 2025-07-07 PROCEDURE — 1036F TOBACCO NON-USER: CPT | Performed by: PHYSICIAN ASSISTANT

## 2025-07-07 ASSESSMENT — ENCOUNTER SYMPTOMS
DYSURIA: 0
BLOOD IN STOOL: 0
APPETITE CHANGE: 0
COUGH: 0
CONSTIPATION: 0
BRUISES/BLEEDS EASILY: 0
HEMATURIA: 0
DEPRESSION: 0
SORE THROAT: 0
TROUBLE SWALLOWING: 0
NAUSEA: 0
CONFUSION: 0
ABDOMINAL PAIN: 0
UNEXPECTED WEIGHT CHANGE: 0
EYE REDNESS: 0
LOSS OF SENSATION IN FEET: 0
WEAKNESS: 0
AGITATION: 0
JOINT SWELLING: 0
ABDOMINAL DISTENTION: 1
OCCASIONAL FEELINGS OF UNSTEADINESS: 0
CHOKING: 0
DYSPHORIC MOOD: 0
DIARRHEA: 1
VOMITING: 0

## 2025-07-07 ASSESSMENT — PAIN SCALES - GENERAL: PAINLEVEL_OUTOF10: 0-NO PAIN

## 2025-07-07 NOTE — PROGRESS NOTES
History Of Present Illness  Agnes Vasquez is a 70 y.o. female with h/o NICM, anxiety,  IBS and pneumonia (back in March 2025) was here for postprandial epigastric pain, with bowel movement urgency and unintentional weight loss back in March 2025.     Pt dxd with pneumonia and  was taking abx during the time of her visit with me.  She was c/o diarrhea so  a c.diff pcr was checked on 3/8/25 and it was negative.     Recent imaging include:  CT chest (3/7/25) done to assess for PE secondary to CP -   No evid of PE. Multifocal mucous plugging of lower lobes with patchy ground glass and consolidative opacities favoring aspiration or pneumonia. 6mm cavitary lesion in right upper lobe. Small  HH.   Repeat CT chest was don on May 2025 and showed resolution of bilateral infiltrates.     CT abd/p without IV contrast  (12/5/24) done secondary to c/o weight loss  - No acute findings. Small 1cm hernia in mid abdomen, no incarceration. Diverticulosis in right colon.     Previous GI workup:  Has been seen by Dr. Gaby Méndez at [facility]. Last visit was on June 2022.  The visit was a consultation prior to proceeding with a screening colonoscopy.    Colonoscopy was done on 6/30/22. Pt was asymptomatic at that time. Results showed diverticulosis, non bleeding internal hemorrhoids. No specimens collected. It was recommended she repeat surveillance in 5 yrs (2027).     Had EGD back in 12/2015 secondary to dyspepsia and weight loss. It showed 2 cm HH, gastritis (bx normal, no alteration) , normal duodenum (bx normal, no alteration)  A Colonoscopy was also done on same day. It showed diverticulosis and internal hemorrhoids. No specimens collected.      First screening colonoscopy was on 7/2013 and there were no polyps found in that exam. Just diverticulosis and hemorrhoids.     In the past, GI at [facility] did check CBC,  TSH and celiac panel secondary to her concerns of lower abdominal pain, bloating, loose stools and weight loss. All labs were  "normal except for mild  Leukopenia. Stool studies (cultures, c.diff, ova/parasite and fecal fat) were ordered and normal as well.  Her last CMP from  also showed normal albumin and total protein.  She was then started on Benefiber and prescribed Bentyl. After reviewing some notes, these meds did help with her sx.     Weight today - 133 lbs  Weight from 2025 - 128 lbs    At last visit with me, pt c/o some epigastric pain, that can occur in random moments and sometimes after a meal, and she denied any n/v or acid reflux.  She also  has an urge to have a BM after eating (within 30 minutes), but it is formed and there is no diarrhea or blood. Pt was concerned she may not be absorbing her nutrients because she has a Bm right after eating and it seems to \"right through her\".   She was also concerned of her unintentional weight loss.     I then ordered labs (CMP, celiac abs, fecal elastase) and ordered an EGD. Her labs were normal and celiac abs negative. Pt also had a gallbladder ultrasound done at that time and was normal.     EGD done in May 2025 - 4cm type  I  HH, mild antral gastritis, normal duodenum.  No specimens collected.   I corresponded with pt regarding these results to see if she wanted to start a PPI, but pt did not respond back (message was seen by patient).     Pt is back today for follow up. Pt is seeing a dietician and eating well and has gained some weight. Pt denies any heartburn, regurgitation, CP , n/v, sore throat or globus sensation.  Pt still c/o daily abdominal bloating with distention  and frequent mushy/loose stool, 3x/day. She admits that she does have more symptoms when she is \"out\" and gets anxious and has the urgency to have a BM.    Past Medical History  NCIM, anxiety, IBS    Surgical History  She has a past surgical history that includes Breast biopsy (Left) and  section, low transverse.  C section x 1  Bunionectomy x 2     Social History  She reports that she quit " smoking about 33 years ago. Her smoking use included cigarettes. She started smoking about 49 years ago. She has a 16 pack-year smoking history. She has never used smokeless tobacco. She reports current alcohol use of about 2.0 standard drinks of alcohol per week. She reports that she does not use drugs.  Occasional ETOH - 1-2 drinks/month    Family History  Family History   Family history unknown: Yes   Problem Relation Name Age of Onset    Family history unknown: Yes    Arthritis Sister Carmina     Diabetes Sister Ragsdale     Hypertension Sister Ragsdale     Arthritis Sister Ragsdale     Depression Daughter Attiyah     Blood clot Daughter Attiyah     Diabetes Sister Meka     Hypertension Sister Meka     Arthritis Sister Meka     Early natural death Mother Yelena     Depression Sister older     Cancer Sister older     Diabetes Sister older     Hypertension Sister older     Rashes / Skin problems Sister older     Arthritis Sister older     Psoriasis Maternal Grandmother      Depression Sister Carmina     Diabetes Sister Carmina     Hypertension Sister Carmina     Arthritis Sister Carmina     Diabetes Sister Ragsdale     Hypertension Sister Ragsdale     Arthritis Sister Ragsdale     Cancer Sister Marianna     Depression Daughter Attiaileenh     Diabetes Sister Meka     Hypertension Sister Meka     Early natural death Mother Yelena      No GI cancers  Allergies  Contact metal agent, Meperidine, Mirabegron, and Latex      Review of Systems   Constitutional:  Negative for appetite change and unexpected weight change.   HENT:  Negative for sore throat and trouble swallowing.    Eyes:  Negative for redness.   Respiratory:  Negative for cough and choking.    Cardiovascular:  Negative for chest pain.   Gastrointestinal:  Positive for abdominal distention (bloating) and diarrhea (frequent BMs, can be loose). Negative for abdominal pain, blood in stool, constipation, nausea and vomiting.   Genitourinary:  Negative  for dysuria and hematuria.   Musculoskeletal:  Negative for joint swelling.   Skin:  Negative for pallor and rash.   Neurological:  Negative for weakness.   Hematological:  Does not bruise/bleed easily.   Psychiatric/Behavioral:  Negative for agitation, confusion and dysphoric mood.           /60   Pulse (!) 43   Temp 36.2 °C (97.2 °F)   Wt 60.3 kg (133 lb)   SpO2 96% Comment: ra  BMI 22.83 kg/m²   Physical Exam  Vitals reviewed.   Constitutional:       General: She is not in acute distress.     Appearance: Normal appearance. She is normal weight. She is not ill-appearing.   HENT:      Head: Normocephalic and atraumatic.      Mouth/Throat:      Mouth: Mucous membranes are moist.      Pharynx: Oropharynx is clear. No oropharyngeal exudate or posterior oropharyngeal erythema.   Eyes:      General: No scleral icterus.     Pupils: Pupils are equal, round, and reactive to light.   Cardiovascular:      Rate and Rhythm: Normal rate and regular rhythm.      Heart sounds: Normal heart sounds.   Pulmonary:      Effort: Pulmonary effort is normal. No respiratory distress.      Breath sounds: Normal breath sounds.   Abdominal:      General: Bowel sounds are normal. There is no distension.      Palpations: Abdomen is soft.      Tenderness: There is no abdominal tenderness. There is no guarding or rebound.   Musculoskeletal:         General: No swelling or deformity.      Cervical back: Neck supple.   Lymphadenopathy:      Cervical: No cervical adenopathy.   Skin:     General: Skin is warm.      Coloration: Skin is not jaundiced.      Findings: No rash.   Neurological:      General: No focal deficit present.      Mental Status: She is alert. Mental status is at baseline.   Psychiatric:         Mood and Affect: Mood normal.         Behavior: Behavior normal.         Thought Content: Thought content normal.       Relevant Results    Assessment/Plan:  1) Abdominal bloating, with urgency and frequent Bms -  Will obtain  hydrogen breath test to assess for SIBO. If positive, will treat accordingly. If negative, pt to do a trial of IB Guard - take 1-2 caps daily.  Samples were given. Pt likely has IBS-D.    2) Health Maintenance -  Pt will be due for surveillance colonoscopy in 2027    More recs to follow    Aniya Baeza PA-C

## 2025-07-07 NOTE — PATIENT INSTRUCTIONS
Go to lab and see if they perform hydrogen breath test to assess for SIBO. If not, call [phone] to schedule.     You can try IB guard  ( take 1-2 caps daily, as needed)

## 2025-07-10 ENCOUNTER — TELEPHONE (OUTPATIENT)
Dept: PRIMARY CARE | Facility: CLINIC | Age: 70
End: 2025-07-10

## 2025-07-10 DIAGNOSIS — Z12.31 VISIT FOR SCREENING MAMMOGRAM: Primary | ICD-10-CM

## 2025-07-16 ENCOUNTER — APPOINTMENT (OUTPATIENT)
Dept: RADIOLOGY | Facility: CLINIC | Age: 70
End: 2025-07-16
Payer: MEDICARE

## 2025-07-16 VITALS — WEIGHT: 133 LBS | BODY MASS INDEX: 22.71 KG/M2 | HEIGHT: 64 IN

## 2025-07-16 DIAGNOSIS — Z12.31 VISIT FOR SCREENING MAMMOGRAM: ICD-10-CM

## 2025-07-16 PROCEDURE — 77067 SCR MAMMO BI INCL CAD: CPT

## 2025-07-16 PROCEDURE — 77063 BREAST TOMOSYNTHESIS BI: CPT | Performed by: STUDENT IN AN ORGANIZED HEALTH CARE EDUCATION/TRAINING PROGRAM

## 2025-07-16 PROCEDURE — 77067 SCR MAMMO BI INCL CAD: CPT | Performed by: STUDENT IN AN ORGANIZED HEALTH CARE EDUCATION/TRAINING PROGRAM

## 2025-07-21 ENCOUNTER — APPOINTMENT (OUTPATIENT)
Dept: GASTROENTEROLOGY | Facility: CLINIC | Age: 70
End: 2025-07-21
Payer: MEDICARE

## 2025-07-22 DIAGNOSIS — Z00.00 HEALTH CARE MAINTENANCE: Primary | ICD-10-CM

## 2025-07-23 ENCOUNTER — TREATMENT (OUTPATIENT)
Dept: PHYSICAL THERAPY | Facility: CLINIC | Age: 70
End: 2025-07-23
Payer: MEDICARE

## 2025-07-23 DIAGNOSIS — M79.10 MYALGIA: Primary | ICD-10-CM

## 2025-07-23 DIAGNOSIS — M54.16 LUMBAR RADICULOPATHY, RIGHT: ICD-10-CM

## 2025-07-23 DIAGNOSIS — M54.2 NECK PAIN: ICD-10-CM

## 2025-07-23 LAB
ANION GAP SERPL CALCULATED.4IONS-SCNC: 9 MMOL/L (CALC) (ref 7–17)
BUN SERPL-MCNC: 13 MG/DL (ref 7–25)
BUN/CREAT SERPL: NORMAL (CALC) (ref 6–22)
CALCIUM SERPL-MCNC: 9.7 MG/DL (ref 8.6–10.4)
CHLORIDE SERPL-SCNC: 107 MMOL/L (ref 98–110)
CO2 SERPL-SCNC: 25 MMOL/L (ref 20–32)
CREAT SERPL-MCNC: 0.83 MG/DL (ref 0.6–1)
EGFRCR SERPLBLD CKD-EPI 2021: 76 ML/MIN/1.73M2
GLUCOSE SERPL-MCNC: 94 MG/DL (ref 65–99)
POTASSIUM SERPL-SCNC: 4.2 MMOL/L (ref 3.5–5.3)
SODIUM SERPL-SCNC: 141 MMOL/L (ref 135–146)

## 2025-07-23 PROCEDURE — 97140 MANUAL THERAPY 1/> REGIONS: CPT | Mod: GP | Performed by: PHYSICAL THERAPIST

## 2025-07-23 PROCEDURE — 97110 THERAPEUTIC EXERCISES: CPT | Mod: GP | Performed by: PHYSICAL THERAPIST

## 2025-07-23 NOTE — PROGRESS NOTES
Physical Therapy    Physical Therapy  Treatment      Patient Name: Agnes Vasquez  MRN: [id number]  Today's Date: 7/23/25  Visit 2    Time Entry:   Time Calculation  Start Time: 1455  Stop Time: 1600  Time Calculation (min): 65 min     PT Therapeutic Procedures Time Entry  Therapeutic Exercise Time Entry: 25  Manual Therapy Time Entry: 30                   Assessment:    Agnes Vasquez is responsive to Strain counter strain technique to reduce muscle hypertonus of the upper quarter and is becoming more aware necessary postural adjustments and reduction of muscle hypertonus to alleviate her UQ pain.    Plan:   Continue POC including dynamic strengthening with bands and db weights       Problem List Items Addressed This Visit           ICD-10-CM    Lumbar radiculopathy, right M54.16    Myalgia - Primary M79.10    Neck pain M54.2      Subjective    Agnes Vasquez notes that postural setting exercise with weight her feel better aligned in standing. UQ pain a bit less severe than at initial visit               Pain:   6/10 neck , Agnes Vasquez also has chronic LBP      Objective     Observation:  + UQ Tps in usual pattern bilat                              Treatments:  HP to neck and back in supine 10 min  Manual therapy:  Strain counter strain technique to reduce muscle hypertonus  to bilat UT, Levator, rectus capitus, and pec minor  There ex:  Sit tall front hold 10 lbs for postural re-ed and UQ load tolerance  Levator stretch 3 x 30 sec  UT stretch 3x30 sec  Farmer's walk (2) 10 lb dbs 45 sec  EDUCATION:     Access Code: [email or web address]  URL: [email or web address]  Date: 07/23/2025  Prepared by: Kenny Roberts    Exercises  - Upper Trapezius Stretch  - 2 x daily - 7 x weekly - 3 reps - 30 hold  - Seated Levator Scapulae Stretch on Wall  - 2 x daily - 7 x weekly - 3 reps - 30 hold    Goals:  Reduce neck and UQ pain to 0-4/10  Scapulae posture/alignment will be symmetrical  independent hep    Improve NDI score by 2 points.  Insurance Authorization Information  Date of Evaluation: 25    Onset Date: 3/1/2025    Referring Physician: Yoel Carranza     Surgery in the Last 3 months:  no    CPT Codes: Therapeutic Exercise: 00649 Therapeutic Activity: 72475 Neuromuscular Re-Education: 92474 Manual Therapy: 78709 Gait Trainin Self-Care/Home Management Trainin Mechanical Traction: 67314 Electric Stimulation (Attended): 40205 Electric Stimulation (Unattended): 72723 Vasopneumatic Device: 42128 PT Re-Evaluation: 81984     Diagnosis:   Problem List Items Addressed This Visit           ICD-10-CM    Lumbar radiculopathy, right M54.16    Myalgia - Primary M79.10    Neck pain M54.2          Functional Outcome:       OT / PT Evaluation complexity:  moderate    Which of the following best describes the primary reason of therapy: Improving, restoring, or adapting functional mobility or skills    Visits Requested: 12    Date Range: 90 days    Select all conditions that apply: Arthritis Conditions         Kenny Roberts, PT

## 2025-07-24 NOTE — PROGRESS NOTES
Physical Therapy    Physical Therapy  Treatment      Patient Name: Agnes Vasquez  MRN: [id number]  Today's Date: 7/28/25  Visit 3    Time Entry:   Time Calculation  Start Time: 0820  Stop Time: 0905  Time Calculation (min): 45 min     PT Therapeutic Procedures Time Entry  Therapeutic Exercise Time Entry: 15  Manual Therapy Time Entry: 25                   Assessment:    Agnes Vasquez is responsive to Strain counter strain technique to reduce muscle hypertonus of the upper quarter. The manual treatment allows her to exercise more comfortably and through a greater cervical AROM  Plan:   Continue POC including dynamic strengthening with bands and db weights       Problem List Items Addressed This Visit           ICD-10-CM    Myalgia M79.10    Neck pain - Primary M54.2      Subjective    Agnes Vasquez notes that she has been at a conference sitting the last 3 days and did not do the exercises.                Pain:   7/10 neck , Agnes Vasquez also has chronic LBP      Objective     Observation:  + UQ Tps in usual pattern bilat                              Treatments:  HP to neck and back in supine 10 min  Manual therapy:  Strain counter strain technique to reduce muscle hypertonus  to bilat UT, Levator, rectus capitus, and pec minor  There ex:  Sit tall front hold 10 lbs for postural re-ed and UQ load tolerance  Levator stretch 3 x 30 sec  UT stretch 3x30 sec  Standing shoulder extension with stick and  scapular pinch 2x10  Farmer's walk (2) 10 lb dbs 45 sec x 2  EDUCATION:     Added shoulder extension postural reset to HEP    Goals:  Reduce neck and UQ pain to 0-4/10  Scapulae posture/alignment will be symmetrical  independent hep   Improve NDI score by 2 points.  Insurance Authorization Information  Date of Evaluation: 7/2/25    Onset Date: 3/1/2025    Referring Physician: Yoel Carranza     Surgery in the Last 3 months:  no    CPT Codes: Therapeutic Exercise: 65428 Therapeutic Activity: 47943  Neuromuscular Re-Education: 36963 Manual Therapy: 89481 Gait Trainin Self-Care/Home Management Trainin Mechanical Traction: 45411 Electric Stimulation (Attended): 19670 Electric Stimulation (Unattended): 81023 Vasopneumatic Device: 27166 PT Re-Evaluation: 54017     Diagnosis:   Problem List Items Addressed This Visit           ICD-10-CM    Myalgia M79.10    Neck pain - Primary M54.2          Functional Outcome:       OT / PT Evaluation complexity:  moderate    Which of the following best describes the primary reason of therapy: Improving, restoring, or adapting functional mobility or skills    Visits Requested: 12    Date Range: 90 days    Select all conditions that apply: Arthritis Conditions         Kenny Roberts, PT

## 2025-07-28 ENCOUNTER — TREATMENT (OUTPATIENT)
Dept: PHYSICAL THERAPY | Facility: CLINIC | Age: 70
End: 2025-07-28
Payer: MEDICARE

## 2025-07-28 DIAGNOSIS — M54.2 NECK PAIN: Primary | ICD-10-CM

## 2025-07-28 DIAGNOSIS — M79.10 MYALGIA: ICD-10-CM

## 2025-07-28 PROCEDURE — 97140 MANUAL THERAPY 1/> REGIONS: CPT | Mod: GP | Performed by: PHYSICAL THERAPIST

## 2025-07-28 PROCEDURE — 97110 THERAPEUTIC EXERCISES: CPT | Mod: GP | Performed by: PHYSICAL THERAPIST

## 2025-07-30 ENCOUNTER — APPOINTMENT (OUTPATIENT)
Dept: GASTROENTEROLOGY | Facility: HOSPITAL | Age: 70
End: 2025-07-30
Payer: MEDICARE

## 2025-08-04 ENCOUNTER — APPOINTMENT (OUTPATIENT)
Dept: PHYSICAL THERAPY | Facility: CLINIC | Age: 70
End: 2025-08-04
Payer: MEDICARE

## 2025-08-11 ENCOUNTER — TREATMENT (OUTPATIENT)
Dept: PHYSICAL THERAPY | Facility: CLINIC | Age: 70
End: 2025-08-11
Payer: MEDICARE

## 2025-08-11 DIAGNOSIS — M54.2 NECK PAIN: Primary | ICD-10-CM

## 2025-08-11 DIAGNOSIS — M79.10 MYALGIA: ICD-10-CM

## 2025-08-11 DIAGNOSIS — M54.16 LUMBAR RADICULOPATHY, RIGHT: ICD-10-CM

## 2025-08-11 PROCEDURE — 97140 MANUAL THERAPY 1/> REGIONS: CPT | Mod: GP | Performed by: PHYSICAL THERAPIST

## 2025-08-11 PROCEDURE — 97110 THERAPEUTIC EXERCISES: CPT | Mod: GP | Performed by: PHYSICAL THERAPIST

## 2025-08-18 ENCOUNTER — TREATMENT (OUTPATIENT)
Dept: PHYSICAL THERAPY | Facility: CLINIC | Age: 70
End: 2025-08-18
Payer: MEDICARE

## 2025-08-18 DIAGNOSIS — M54.16 LUMBAR RADICULOPATHY, RIGHT: ICD-10-CM

## 2025-08-18 DIAGNOSIS — M54.2 NECK PAIN: Primary | ICD-10-CM

## 2025-08-18 DIAGNOSIS — M79.10 MYALGIA: ICD-10-CM

## 2025-08-18 PROCEDURE — 97140 MANUAL THERAPY 1/> REGIONS: CPT | Mod: GP | Performed by: PHYSICAL THERAPIST

## 2025-08-18 PROCEDURE — 97110 THERAPEUTIC EXERCISES: CPT | Mod: GP | Performed by: PHYSICAL THERAPIST

## 2025-08-25 ENCOUNTER — TREATMENT (OUTPATIENT)
Dept: PHYSICAL THERAPY | Facility: CLINIC | Age: 70
End: 2025-08-25
Payer: MEDICARE

## 2025-08-25 DIAGNOSIS — M54.16 LUMBAR RADICULOPATHY, RIGHT: ICD-10-CM

## 2025-08-25 DIAGNOSIS — M79.10 MYALGIA: Primary | ICD-10-CM

## 2025-08-25 DIAGNOSIS — M54.2 NECK PAIN: ICD-10-CM

## 2025-08-25 PROCEDURE — 97535 SELF CARE MNGMENT TRAINING: CPT | Mod: GP | Performed by: PHYSICAL THERAPIST

## 2025-08-25 PROCEDURE — 97110 THERAPEUTIC EXERCISES: CPT | Mod: GP | Performed by: PHYSICAL THERAPIST

## 2025-08-26 DIAGNOSIS — M79.10 MYALGIA: Primary | ICD-10-CM

## 2025-08-26 DIAGNOSIS — M54.50 CHRONIC BILATERAL LOW BACK PAIN, UNSPECIFIED WHETHER SCIATICA PRESENT: ICD-10-CM

## 2025-08-26 DIAGNOSIS — G89.29 CHRONIC BILATERAL LOW BACK PAIN, UNSPECIFIED WHETHER SCIATICA PRESENT: ICD-10-CM

## 2025-08-28 ENCOUNTER — PROCEDURE VISIT (OUTPATIENT)
Dept: GASTROENTEROLOGY | Facility: CLINIC | Age: 70
End: 2025-08-28
Payer: MEDICARE

## 2025-08-28 DIAGNOSIS — R14.0 ABDOMINAL BLOATING: ICD-10-CM

## 2025-08-28 DIAGNOSIS — R19.4 FREQUENT BOWEL MOVEMENTS: ICD-10-CM

## 2025-08-28 PROCEDURE — 91065 BREATH HYDROGEN/METHANE TEST: CPT

## 2025-08-28 PROCEDURE — 91065 BREATH HYDROGEN/METHANE TEST: CPT | Performed by: INTERNAL MEDICINE

## 2025-09-02 ENCOUNTER — RESULTS FOLLOW-UP (OUTPATIENT)
Dept: GASTROENTEROLOGY | Facility: HOSPITAL | Age: 70
End: 2025-09-02
Payer: MEDICARE

## 2025-09-02 DIAGNOSIS — K58.0 IRRITABLE BOWEL SYNDROME WITH DIARRHEA: Primary | ICD-10-CM

## 2025-09-03 ENCOUNTER — SPECIALTY PHARMACY (OUTPATIENT)
Dept: PHARMACY | Facility: CLINIC | Age: 70
End: 2025-09-03

## 2025-09-30 ENCOUNTER — APPOINTMENT (OUTPATIENT)
Dept: DERMATOLOGY | Facility: CLINIC | Age: 70
End: 2025-09-30
Payer: MEDICARE

## 2026-03-27 ENCOUNTER — APPOINTMENT (OUTPATIENT)
Dept: NEUROLOGY | Facility: CLINIC | Age: 71
End: 2026-03-27
Payer: MEDICARE